# Patient Record
Sex: FEMALE | Race: WHITE | Employment: UNEMPLOYED | URBAN - METROPOLITAN AREA
[De-identification: names, ages, dates, MRNs, and addresses within clinical notes are randomized per-mention and may not be internally consistent; named-entity substitution may affect disease eponyms.]

---

## 2021-05-19 ENCOUNTER — APPOINTMENT (OUTPATIENT)
Dept: ULTRASOUND IMAGING | Age: 86
DRG: 057 | End: 2021-05-19
Payer: MEDICARE

## 2021-05-19 ENCOUNTER — APPOINTMENT (OUTPATIENT)
Dept: CT IMAGING | Age: 86
DRG: 057 | End: 2021-05-19
Payer: MEDICARE

## 2021-05-19 ENCOUNTER — APPOINTMENT (OUTPATIENT)
Dept: MRI IMAGING | Age: 86
DRG: 057 | End: 2021-05-19
Payer: MEDICARE

## 2021-05-19 ENCOUNTER — HOSPITAL ENCOUNTER (INPATIENT)
Age: 86
LOS: 5 days | Discharge: NURSING, REHAB OR LONG-TERM CARE | DRG: 057 | End: 2021-05-26
Attending: EMERGENCY MEDICINE | Admitting: FAMILY MEDICINE
Payer: MEDICARE

## 2021-05-19 ENCOUNTER — APPOINTMENT (OUTPATIENT)
Dept: GENERAL RADIOLOGY | Age: 86
DRG: 057 | End: 2021-05-19
Payer: MEDICARE

## 2021-05-19 DIAGNOSIS — R53.1 WEAKNESS OF ONE SIDE OF BODY: ICD-10-CM

## 2021-05-19 DIAGNOSIS — R41.82 ALTERED MENTAL STATUS, UNSPECIFIED ALTERED MENTAL STATUS TYPE: Primary | ICD-10-CM

## 2021-05-19 DIAGNOSIS — R26.9 GAIT DISTURBANCE: ICD-10-CM

## 2021-05-19 LAB
ALBUMIN SERPL-MCNC: 3.8 G/DL (ref 3.5–4.6)
ALP BLD-CCNC: 99 U/L (ref 40–130)
ALT SERPL-CCNC: 7 U/L (ref 0–33)
ANION GAP SERPL CALCULATED.3IONS-SCNC: 9 MEQ/L (ref 9–15)
AST SERPL-CCNC: 17 U/L (ref 0–35)
BACTERIA: NEGATIVE /HPF
BASOPHILS ABSOLUTE: 0 K/UL (ref 0–0.2)
BASOPHILS RELATIVE PERCENT: 0.8 %
BILIRUB SERPL-MCNC: 0.8 MG/DL (ref 0.2–0.7)
BILIRUBIN URINE: NEGATIVE
BLOOD, URINE: ABNORMAL
BUN BLDV-MCNC: 25 MG/DL (ref 8–23)
CALCIUM SERPL-MCNC: 8.9 MG/DL (ref 8.5–9.9)
CHLORIDE BLD-SCNC: 101 MEQ/L (ref 95–107)
CLARITY: CLEAR
CO2: 25 MEQ/L (ref 20–31)
COLOR: YELLOW
CREAT SERPL-MCNC: 0.74 MG/DL (ref 0.5–0.9)
EKG ATRIAL RATE: 77 BPM
EKG P AXIS: 63 DEGREES
EKG P-R INTERVAL: 136 MS
EKG Q-T INTERVAL: 388 MS
EKG QRS DURATION: 84 MS
EKG QTC CALCULATION (BAZETT): 439 MS
EKG R AXIS: 58 DEGREES
EKG T AXIS: 37 DEGREES
EKG VENTRICULAR RATE: 77 BPM
EOSINOPHILS ABSOLUTE: 0 K/UL (ref 0–0.7)
EOSINOPHILS RELATIVE PERCENT: 0.4 %
EPITHELIAL CELLS, UA: NORMAL /HPF (ref 0–5)
GFR AFRICAN AMERICAN: >60
GFR NON-AFRICAN AMERICAN: >60
GLOBULIN: 2.9 G/DL (ref 2.3–3.5)
GLUCOSE BLD-MCNC: 109 MG/DL (ref 70–99)
GLUCOSE URINE: NEGATIVE MG/DL
HCT VFR BLD CALC: 35.2 % (ref 37–47)
HEMOGLOBIN: 11.9 G/DL (ref 12–16)
HYALINE CASTS: NORMAL /HPF (ref 0–5)
KETONES, URINE: NEGATIVE MG/DL
LACTIC ACID: 0.8 MMOL/L (ref 0.5–2.2)
LEUKOCYTE ESTERASE, URINE: NEGATIVE
LYMPHOCYTES ABSOLUTE: 0.8 K/UL (ref 1–4.8)
LYMPHOCYTES RELATIVE PERCENT: 12.9 %
MAGNESIUM: 1.8 MG/DL (ref 1.7–2.4)
MCH RBC QN AUTO: 29.4 PG (ref 27–31.3)
MCHC RBC AUTO-ENTMCNC: 33.8 % (ref 33–37)
MCV RBC AUTO: 87.2 FL (ref 82–100)
MONOCYTES ABSOLUTE: 0.6 K/UL (ref 0.2–0.8)
MONOCYTES RELATIVE PERCENT: 9 %
NEUTROPHILS ABSOLUTE: 4.8 K/UL (ref 1.4–6.5)
NEUTROPHILS RELATIVE PERCENT: 76.9 %
NITRITE, URINE: NEGATIVE
PDW BLD-RTO: 13.5 % (ref 11.5–14.5)
PH UA: 5 (ref 5–9)
PLATELET # BLD: 221 K/UL (ref 130–400)
POTASSIUM SERPL-SCNC: 4.1 MEQ/L (ref 3.4–4.9)
PROTEIN UA: NEGATIVE MG/DL
RBC # BLD: 4.03 M/UL (ref 4.2–5.4)
RBC UA: NORMAL /HPF (ref 0–5)
SODIUM BLD-SCNC: 135 MEQ/L (ref 135–144)
SPECIFIC GRAVITY UA: 1.02 (ref 1–1.03)
TOTAL PROTEIN: 6.7 G/DL (ref 6.3–8)
TROPONIN: <0.01 NG/ML (ref 0–0.01)
URINE REFLEX TO CULTURE: ABNORMAL
UROBILINOGEN, URINE: 1 E.U./DL
WBC # BLD: 6.3 K/UL (ref 4.8–10.8)
WBC UA: NORMAL /HPF (ref 0–5)

## 2021-05-19 PROCEDURE — 99284 EMERGENCY DEPT VISIT MOD MDM: CPT

## 2021-05-19 PROCEDURE — 83605 ASSAY OF LACTIC ACID: CPT

## 2021-05-19 PROCEDURE — 73090 X-RAY EXAM OF FOREARM: CPT

## 2021-05-19 PROCEDURE — G0378 HOSPITAL OBSERVATION PER HR: HCPCS

## 2021-05-19 PROCEDURE — 2580000003 HC RX 258: Performed by: FAMILY MEDICINE

## 2021-05-19 PROCEDURE — 83735 ASSAY OF MAGNESIUM: CPT

## 2021-05-19 PROCEDURE — 84484 ASSAY OF TROPONIN QUANT: CPT

## 2021-05-19 PROCEDURE — 6370000000 HC RX 637 (ALT 250 FOR IP): Performed by: FAMILY MEDICINE

## 2021-05-19 PROCEDURE — 70551 MRI BRAIN STEM W/O DYE: CPT

## 2021-05-19 PROCEDURE — 70544 MR ANGIOGRAPHY HEAD W/O DYE: CPT

## 2021-05-19 PROCEDURE — 97166 OT EVAL MOD COMPLEX 45 MIN: CPT

## 2021-05-19 PROCEDURE — 97162 PT EVAL MOD COMPLEX 30 MIN: CPT

## 2021-05-19 PROCEDURE — 96372 THER/PROPH/DIAG INJ SC/IM: CPT

## 2021-05-19 PROCEDURE — 93005 ELECTROCARDIOGRAM TRACING: CPT | Performed by: EMERGENCY MEDICINE

## 2021-05-19 PROCEDURE — 93880 EXTRACRANIAL BILAT STUDY: CPT

## 2021-05-19 PROCEDURE — 81001 URINALYSIS AUTO W/SCOPE: CPT

## 2021-05-19 PROCEDURE — 80053 COMPREHEN METABOLIC PANEL: CPT

## 2021-05-19 PROCEDURE — 6360000002 HC RX W HCPCS: Performed by: FAMILY MEDICINE

## 2021-05-19 PROCEDURE — 93010 ELECTROCARDIOGRAM REPORT: CPT | Performed by: INTERNAL MEDICINE

## 2021-05-19 PROCEDURE — 70450 CT HEAD/BRAIN W/O DYE: CPT

## 2021-05-19 PROCEDURE — 85025 COMPLETE CBC W/AUTO DIFF WBC: CPT

## 2021-05-19 PROCEDURE — 36415 COLL VENOUS BLD VENIPUNCTURE: CPT

## 2021-05-19 RX ORDER — LEVOTHYROXINE SODIUM 88 UG/1
88 TABLET ORAL DAILY
COMMUNITY

## 2021-05-19 RX ORDER — ONDANSETRON 2 MG/ML
4 INJECTION INTRAMUSCULAR; INTRAVENOUS EVERY 6 HOURS PRN
Status: DISCONTINUED | OUTPATIENT
Start: 2021-05-19 | End: 2021-05-26 | Stop reason: HOSPADM

## 2021-05-19 RX ORDER — SODIUM CHLORIDE 0.9 % (FLUSH) 0.9 %
5-40 SYRINGE (ML) INJECTION EVERY 12 HOURS SCHEDULED
Status: DISCONTINUED | OUTPATIENT
Start: 2021-05-19 | End: 2021-05-26 | Stop reason: HOSPADM

## 2021-05-19 RX ORDER — SODIUM CHLORIDE 9 MG/ML
25 INJECTION, SOLUTION INTRAVENOUS PRN
Status: DISCONTINUED | OUTPATIENT
Start: 2021-05-19 | End: 2021-05-26 | Stop reason: HOSPADM

## 2021-05-19 RX ORDER — ASPIRIN 81 MG/1
81 TABLET ORAL DAILY
Status: DISCONTINUED | OUTPATIENT
Start: 2021-05-19 | End: 2021-05-24

## 2021-05-19 RX ORDER — POLYETHYLENE GLYCOL 3350 17 G/17G
17 POWDER, FOR SOLUTION ORAL DAILY PRN
Status: DISCONTINUED | OUTPATIENT
Start: 2021-05-19 | End: 2021-05-26 | Stop reason: HOSPADM

## 2021-05-19 RX ORDER — PROMETHAZINE HYDROCHLORIDE 12.5 MG/1
12.5 TABLET ORAL EVERY 6 HOURS PRN
Status: DISCONTINUED | OUTPATIENT
Start: 2021-05-19 | End: 2021-05-26 | Stop reason: HOSPADM

## 2021-05-19 RX ORDER — ASPIRIN 300 MG/1
300 SUPPOSITORY RECTAL DAILY
Status: DISCONTINUED | OUTPATIENT
Start: 2021-05-19 | End: 2021-05-24

## 2021-05-19 RX ORDER — SODIUM CHLORIDE 0.9 % (FLUSH) 0.9 %
5-40 SYRINGE (ML) INJECTION PRN
Status: DISCONTINUED | OUTPATIENT
Start: 2021-05-19 | End: 2021-05-26 | Stop reason: HOSPADM

## 2021-05-19 RX ORDER — ATORVASTATIN CALCIUM 40 MG/1
80 TABLET, FILM COATED ORAL NIGHTLY
Status: DISCONTINUED | OUTPATIENT
Start: 2021-05-19 | End: 2021-05-24

## 2021-05-19 RX ADMIN — ATORVASTATIN CALCIUM 80 MG: 40 TABLET, FILM COATED ORAL at 22:09

## 2021-05-19 RX ADMIN — Medication 10 ML: at 22:09

## 2021-05-19 RX ADMIN — ASPIRIN 81 MG: 81 TABLET, COATED ORAL at 17:33

## 2021-05-19 RX ADMIN — ENOXAPARIN SODIUM 40 MG: 40 INJECTION SUBCUTANEOUS at 17:33

## 2021-05-19 ASSESSMENT — PAIN SCALES - PAIN ASSESSMENT IN ADVANCED DEMENTIA (PAINAD)
TOTALSCORE: 2
BREATHING: 0
FACIALEXPRESSION: 0
NEGVOCALIZATION: 1

## 2021-05-19 NOTE — ED NOTES
Daughter states the Pt has c/o right forearm pain for the past week, Dr Christ Lopez aware, orders received.      Benjy Stewart RN  05/19/21 1746

## 2021-05-19 NOTE — CARE COORDINATION
[facility] [place] Case Management Initial Discharge Assessment    Met with Patient to discuss discharge plan. PCP:   Dr Marquez Pastrana                              Date of Last Visit: 2 months    If no PCP, list provided? N/A    Discharge Planning    Living Arrangements: at home dependent on family care    Who do you live with? daughter    Who helps you with your care:  family    If lives at home:     Do you have any barriers navigating in your home? no    Patient can perform ADL? Needs assistance    Current Services (outpatient and in home) :  None    Dialysis: No    Is transportation available to get to your appointments? Yes    DME Equipment:  no    Respiratory equipment: None    Respiratory provider:  no     Pharmacy:  yes - [facility]    Consult with Medication Assistance Program?  No      Patient agreeable to [facility]? Information given to the patient's daughter    Patient agreeable to SNF/Rehab? Information given to pt's daughter    Other discharge needs identified? She may need placement vs hhc    Does Patient Have a High-Risk for Readmission Diagnosis (CHF, PN, MI, COPD)? No    Initial Discharge Plan? (Note: please see concurrent daily documentation for any updates after initial note). Daughter requested and is given information on hhc/snf/rehab facilities. Cm to follow up.     Readmission Risk              Risk of Unplanned Readmission:  0         Electronically signed by Adam Carrizales on 5/19/2021 at 4:43 PM

## 2021-05-19 NOTE — ACP (ADVANCE CARE PLANNING)
Advance Care Planning     Advance Care Planning Activator (Inpatient)  Conversation Note      Date of ACP Conversation: 5/19/2021     Conversation Conducted with:  Healthcare Decision Maker: Named in Advance Directive or Healthcare Power of  (name) Celestino Mann    ACP Activator: Augie Weller    Pt has a signed dnrcc-arrest on the chart. Health Care Decision Maker:     Current Designated Health Care Decision Maker:  Kali Posada: daughter,poa. Xi Marie, son, [phone] secondary decision maker.

## 2021-05-19 NOTE — PROGRESS NOTES
Physical Therapy Med Surg Initial Assessment  Facility/Department: [facility]  Room: [facility]/S605-86       NAME: Shanika Pascal  : 1935 (80 y.o.)  MRN: [id number]  CODE STATUS: DNR-CCA    Date of Service: 2021    Patient Diagnosis(es): Right sided weakness [R53.1]   Chief Complaint   Patient presents with    Altered Mental Status     daughter called EMS for increased AMS and weakness that started this AM when she woke up      Patient Active Problem List    Diagnosis Date Noted    Right sided weakness 2021        Past Medical History:   Diagnosis Date    Alzheimer disease ([facility] [address]     Thyroid disease      History reviewed. No pertinent surgical history. Chart Reviewed: Yes  Patient assessed for rehabilitation services?: Yes  Family / Caregiver Present: Yes (dtr)  General Comment  Comments: Pt laying in ED cot, agreeable to PT eval    Restrictions:  Restrictions/Precautions: Fall Risk (High per clinical judgement)     SUBJECTIVE:   Garbled speech, difficulty understanding pt.      Pain  Pre Treatment Pain Screening  Comments / Details: pt with advanced dementia, c/o R wrist pain, unable to rate, does not appear to be in significant distress    Post Treatment Pain Screening:   Pain Screening  Patient Currently in Pain: Yes  Pain Assessment  Pain Assessment: Advanced Dementia    Prior Level of Function:  Social/Functional History  Lives With: Daughter  Type of Home: House  Home Layout: Multi-level (4 stairs down to familyroom, full bath on lower level- pt stays on this level)  Home Access: Level entry  Bathroom Shower/Tub: Walk-in shower  Bathroom Equipment: Shower chair  Home Equipment: Rolling walker  ADL Assistance: Independent  Homemaking Assistance: Needs assistance  Homemaking Responsibilities: No  Ambulation Assistance: Independent (assistance with stairs; and hand held recently d/t pain in UE)  Transfer Assistance: Independent  Additional Comments: Dtr reports increased caregiver burden, reports hx of falls    OBJECTIVE:   Vision: Impaired  Vision Exceptions: Wears glasses for reading  Hearing: Exceptions to [facility]  Hearing Exceptions: Hard of hearing/hearing concerns; Left hearing aid    Cognition:  Overall Orientation Status: Impaired  Orientation Level: Oriented to person, Disoriented to time, Disoriented to situation, Disoriented to place (unable to state )  Follows Commands: Impaired (follows 1 step commands inconsistantly, ~25% of the time, requires increased reps)    Observation/Palpation  Posture: Fair  Observation: pleasantly confused, garbled speech, no acute distress, no visable deformity of R wrist    ROM:  RLE General PROM: impaired hip flexor and HS flexibility  RLE AROM: WFL  LLE General PROM: impaired hip flexor and HS flexibility  LLE AROM : WFL    Strength:  Strength RLE  Comment: hip 3/5, knee 3+/5, ankle 3+/5  Strength LLE  Comment: hip 4/5, knee 4/5, ankle 4/5    Neuro:  Balance  Posture: Fair  Sitting - Static: Fair;- (Min A with R sided lean)  Sitting - Dynamic: Fair;-  Standing - Static: Poor (R sided lean requiring Mod A to maintain midline posture, arm in arm)  Standing - Dynamic: Poor     Motor Control  Gross Motor?: Exceptions  Comments: R LE sided weakness  Sensation  Overall Sensation Status: WFL (denies numbness; responds to light touch and pressure in all extremities)    Bed mobility  Supine to Sit: Moderate assistance;2 Person assistance  Sit to Supine: 2 Person assistance; Moderate assistance   Increased time and effort noted    Transfers  Sit to Stand: Moderate Assistance  Stand to sit: Moderate Assistance  Comment: R lean. VC and tactile cues to initiate standing, assist with R LE placement to widen DAVID, prior to attempt    Ambulation  Ambulation?: Yes  More Ambulation?: Yes  Ambulation 1  Device: Hand-Held Assist (arm in arm)  Assistance: Stand by assistance  Gait Deviations: Slow Darya; Increased DAVID  Distance: 0ft  Comments: pregait- wt shfiting, unable to elicit stepping responce with R LE. Attempts to lift L LE but unable to progress to stepping        Activity Tolerance  Activity Tolerance: Patient Tolerated treatment well      PT Education  PT Education: Goals;PT Role;Plan of Care    ASSESSMENT:   Body structures, Functions, Activity limitations: Decreased functional mobility ; Decreased strength;Decreased ROM; Decreased balance  Decision Making: Medium Complexity  History: med  Exam: med  Clinical Presentation: med    Prognosis: Good  Barriers to Learning: none    DISCHARGE RECOMMENDATIONS:  Discharge Recommendations: Continue to assess pending progress, Patient would benefit from continued therapy after discharge    Assessment: Pt demonstrates the above deficits and decline in functional mobility status placing them at increased risk for falls. Pt demonstrates R sided weakness and R lean in sitting and standing. Pt unable to sequence stepping response despite assistance EOB this date. Pt would benefit from physical therapy to address above deficits and allow for safe return home at highest level of function, decrease risk for falls, and improve QOL.     REQUIRES PT FOLLOW UP: Yes      PLAN OF CARE:  Plan  Times per week: 3-6  Current Treatment Recommendations: Strengthening, Functional Mobility Training, Neuromuscular Re-education, Home Exercise Program, Equipment Evaluation, Education, & procurement, Modalities, Safety Education & Training, Transfer Training, Gait Training, Balance Training, Stair training, Positioning, Patient/Caregiver Education & Training  Safety Devices  Type of devices: Left in bed    Goals:  Patient goals : unable to state; dtr wishes for pt to be able to return home  Long term goals  Long term goal 1: Bed mobility with SBA  Long term goal 2: Functional transfers with SBA  Long term goal 3: Amb household distance    [facility] (6 CLICK) [address] Mobility Raw Score : 10     Therapy Time:   Individual   Time In 1145   Time Out 1208   Minutes [address], 05/19/21 at 4:54 PM         Definitions for assistance levels  Independent = pt does not require any physical supervision or assistance from another person for activity completion. Device may be needed.   Stand by assistance = pt requires verbal cues or instructions from another person, close to but not touching, to perform the activity  Minimal assistance= pt performs 75% or more of the activity; assistance is required to complete the activity  Moderate assistance= pt performs 50% of the activity; assistance is required to complete the activity  Maximal assistance = pt performs 25% of the activity; assistance is required to complete the activity  Dependent = pt requires total physical assistance to accomplish the task

## 2021-05-19 NOTE — FLOWSHEET NOTE
New admission to room 270. Admission assessment done. Alert to self only Disorient to time, place and situation. VSS. Lung sounds clear BS present x3 PERRLA. No signs of distress. Repositioned. No other needs Bed alarm on Call light in reach Awaiting for daughter to visit to finish admission     Inge daughter at bedside assisted with admission. HPOA and living will copies placed in chart. Patients medication (synthroid) sent to pharmacy for verification. 1630 bedside eval done Patient was able to eat a whole applesauce and drink 2 cups of water without difficulties of swallowing and no coughing noted.

## 2021-05-19 NOTE — PROGRESS NOTES
[facility] OCCUPATIONAL THERAPY EVALUATION - ACUTE     NAME: Jose Elias Winkler  : 1935 (80 y.o.)  MRN: [id number]  CODE STATUS: Full Code  Room:     Date of Service: 2021    Patient Diagnosis(es): Right sided weakness [R53.1]   Chief Complaint   Patient presents with    Altered Mental Status     daughter called EMS for increased AMS and weakness that started this AM when she woke up      Patient Active Problem List    Diagnosis Date Noted    Right sided weakness 2021        Past Medical History:   Diagnosis Date    Alzheimer disease ([facility] [address]     Thyroid disease      History reviewed. No pertinent surgical history. Restrictions  Restrictions/Precautions: Fall Risk (High per clinical judgement)     Safety Devices: Safety Devices  Safety Devices in place: Yes  Type of devices: All fall risk precautions in place        Subjective  Pre Treatment Pain Screening  Pain at present: 2  Scale Used:  (Advanced dementia)  Intervention List: Patient able to continue with treatment  Comments / Details: Pt indicates R hand pain; RN aware    Pain Reassessment:   Pain Assessment  Patient Currently in Pain: Yes  Pain Assessment: Advanced Dementia       Prior Level of Function:  Social/Functional History  Lives With: Daughter  Type of Home: House  Home Layout: Multi-level (4 stairs down to familyroom, full bath on lower level- pt stays on this level)  Home Access: Level entry  Bathroom Shower/Tub: Walk-in shower  Bathroom Equipment: Shower chair  Home Equipment: Rolling walker  ADL Assistance: Independent  Homemaking Assistance: Needs assistance  Homemaking Responsibilities: No  Ambulation Assistance: Independent (assistance with stairs; and hand held recently d/t pain in UE)  Transfer Assistance: Independent  Additional Comments: Dtr reports increased caregiver burden, reports hx of falls. Daughter home .  History per daughter; pt. aphasic    OBJECTIVE:     Orientation Status:  Orientation  Overall Orientation Status: Impaired  Orientation Level:  (Pt has baseline dementia. Per daughter, normally oriented to self. Pt. has had significantly increased difficulty verbalizing x3 days per daughter)    Observation:  Observation/Palpation  Posture: Fair (R lean, kyphotic)  Observation: Pt. alert and attentive    Cognition Status:  Cognition  Overall Cognitive Status: Exceptions  Arousal/Alertness: Delayed responses to stimuli  Following Commands: Follows one step commands with increased time, Follows one step commands with repetition  Attention Span: Difficulty attending to directions  Memory:  (Unable to assess d/t aphasia. Per daughter, significantly impaired at baseline)  Safety Judgement: Decreased awareness of need for safety, Decreased awareness of need for assistance  Problem Solving: Decreased awareness of errors  Insights: Decreased awareness of deficits  Initiation: Requires cues for all  Sequencing: Requires cues for all  Cognition Comment: Max increased cues and sequencing for mobility tasks    Perception Status:  Perception  Overall Perceptual Status: WFL    Sensation Status:  Sensation  Overall Sensation Status: [employer]    Vision and Hearing Status:  Vision  Vision: Impaired  Vision Exceptions: Wears glasses for reading  Hearing  Hearing: Exceptions to [facility]  Hearing Exceptions: Hard of hearing/hearing concerns, Left hearing aid     ROM:   LUE AROM (degrees)  LUE General AROM: Shoulders to 80°. Left Hand AROM (degrees)  Left Hand AROM: WFL  RUE AROM (degrees)  RUE AROM : [employer]  RUE General AROM: Shoulders to 80°.   Right Hand AROM (degrees)  Right Hand General AROM: Significant arthritic changes    Strength:  LUE Strength  Gross LUE Strength: Exceptions to [employer]  L Hand General: 3/5  LUE Strength Comment: 3/5 all planes  RUE Strength  Gross RUE Strength: Exceptions to [facility]  R Hand General: NT (Too much pain for attempting)  RUE Strength Comment: 3/5 at shoulders/elbows    Coordination, Tone, Quality of Movement: Tone RUE  RUE Tone: Normotonic  Tone LUE  LUE Tone: Normotonic  Coordination  Movements Are Fluid And Coordinated: No  Coordination and Movement description: Fine motor impairments, Decreased speed, Decreased accuracy, Left UE, Right UE  Quality of Movement Other  Comment: Pt with B hand arthritic changes, R greater than L    Hand Dominance:  Hand Dominance  Hand Dominance: Right    ADL Status:  ADL  Feeding: Moderate assistance  Grooming: Moderate assistance  UE Bathing: Maximum assistance  LE Bathing: Dependent/Total  UE Dressing: Maximum assistance  LE Dressing: Dependent/Total  Toileting: Dependent/Total  Additional Comments: Simulated ADLs as above. Pt. limited d/t R hand pain, decreased sequencing and decreased direction following. Toilet Transfers  Toilet Transfer: Unable to assess  Toilet Transfers Comments: Anticipate mod A       Therapy key for assistance levels -   Independent = Pt. is able to perform task with no assistance but may require a device   Stand by assistance = Pt. does not perform task at an independent level but does not need physical assistance, requires verbal cues  Minimal, Moderate, Maximal Assistance = Pt. requires physical assistance (25%, 50%, 75% assist from helper) for task but is able to actively participate in task   Dependent = Pt. requires total assistance with task and is not able to actively participate with task completion     Functional Mobility:  Functional Mobility  Functional Mobility Comments: Attempts side step to [facility], only manages 2 steps with max A unable to step safely  Transfers  Sit to stand:  Moderate assistance, 2 Person assistance  Stand to sit: Moderate assistance, 2 Person assistance  Transfer Comments: Heavy R lean    Bed Mobility  Bed mobility  Supine to Sit: Moderate assistance, 2 Person assistance  Sit to Supine: 2 Person assistance, Moderate assistance  Comment: Makes good effort to assist. Unable to push with R hand to assist and requires assist of 2 to complete transition to EOB    Seated and Standing Balance:  Balance  Sitting Balance: Moderate assistance  Standing Balance: Maximum assistance (Assist of 2, heavy R lean)    Functional Endurance:  Activity Tolerance  Activity Tolerance: Patient Tolerated treatment well    D/C Recommendations:  OT D/C RECOMMENDATIONS  REQUIRES OT FOLLOW UP: Yes    Equipment Recommendations:  OT Equipment Recommendations  Other: Continue to assess    OT Education:   OT Education  OT Education: OT Role, Plan of Care  Patient Education: Educated pt on importance of mobility, educated family on role of acute care OT  Barriers to Learning: Baseline dementia in pt. OT Follow Up:  OT D/C RECOMMENDATIONS  REQUIRES OT FOLLOW UP: Yes       Assessment/Discharge Disposition:  Assessment: Pt is an 80year old woman from home with family who presents to [facility] with the above deficits which impact her ability to perform ADLs and IADLs. Pt. limited d/t fatigue, weakness and decreased cognition. Pt. would benefit from continued OT to maximize independence and safety with ADL tasks.   Performance deficits / Impairments: Decreased functional mobility , Decreased ADL status, Decreased strength, Decreased safe awareness, Decreased endurance, Decreased cognition, Decreased high-level IADLs, Decreased fine motor control, Decreased balance, Decreased coordination, Decreased posture  Prognosis: Good  Discharge Recommendations: Continue to assess pending progress  Decision Making: Medium Complexity  History: Pt's medical history is moderately complex  Exam: Pt. has 11 performance deficits  Assistance / Modification: Pt. requires max A    Six Click Score   How much help for putting on and taking off regular lower body clothing?: Total  How much help for Bathing?: A Lot  How much help for Toileting?: Total  How much help for putting on and taking off regular upper body clothing?: A Lot  How much help for taking care of personal grooming?: A Lot  How much help for eating meals?: A Little  AM-[facility] Inpatient Daily Activity Raw Score: 11  AM-PAC Inpatient ADL T-Scale Score : 29.04  ADL Inpatient CMS 0-100% Score: 70.42    Plan:  Plan  Times per week: 1-4x  Plan weeks: Length of acute stay  Current Treatment Recommendations: Strengthening, Balance Training, Functional Mobility Training, Endurance Training, Pain Management, Safety Education & Training, Patient/Caregiver Education & Training, Equipment Evaluation, Education, & procurement, Self-Care / ADL, Cognitive/Perceptual Training    Goals:   Patient will:    - Improve functional endurance to tolerate/complete 30 mins of ADL's  - Be Mod A in UB ADLs   - Be Mod A in LB ADLs  - Be Min A in ADL transfers without LOB  - Be Min A in toileting tasks  - Improve R hand fine motor coordination to [facility] in order to manage clothing fasteners/self-care containers in a timely manner  - Improve B UE strength and endurance to 4-/5 in order to participate in self-care activities as projected. - Access appropriate D/C site with as few architectural barriers as possible. - Sequence self-care tasks with 50% verbal cues for sequencing    Patient Goal: Patient goals : Family goal; For pt. to ambulate better     Discussed and agreed upon: Yes Comments:     Therapy Time:   OT Individual Minutes  Time In: 1145  Time Out: [address]  Minutes: 23    Eval: 23 minutes     Electronically signed by:     Lisa Garcia OTR/L, OTR/L  5/19/2021, 12:48 PM

## 2021-05-19 NOTE — ED PROVIDER NOTES
[address] [facility] ED  EMERGENCY DEPARTMENT ENCOUNTER      Pt Name: Socorro Mancuso  MRN: [id number]  [place] 1935  Date of evaluation: 5/19/2021  Provider: Leigh Dance, DO    CHIEF COMPLAINT       Chief Complaint   Patient presents with    Altered Mental Status     daughter called EMS for increased AMS and weakness that started this AM when she woke up          HISTORY OF PRESENT ILLNESS   (Location/Symptom, Timing/Onset, Context/Setting, Quality, Duration, Modifying Factors, Severity)  Note limiting factors. Socorro Mancuso is a 80 y.o. female who presents to the emergency department . Patient brought in because daughter felt patient was more confused and seemed to be weak. Legs were giving out. She did not fall today. She did have a fall last month and has been complaining of right forearm pain since. Patient does have dementia. Has never been seen at this hospital and only moved to the area a year ago. Apparently, she could walk, bath with assistance, feed herself 1 month ago. 3 days ago she became more confused and cant walk, stand or be independent at all. HPI    Nursing Notes were reviewed. REVIEW OF SYSTEMS    (2-9 systems for level 4, 10 or more for level 5)     Review of Systems   Unable to perform ROS: Dementia   Neurological: Positive for weakness. Psychiatric/Behavioral: Positive for confusion. Except as noted above the remainder of the review of systems was reviewed and negative. PAST MEDICAL HISTORY     Past Medical History:   Diagnosis Date    Alzheimer disease ([facility] [address]     Thyroid disease          SURGICAL HISTORY     No past surgical history on file. CURRENT MEDICATIONS       Previous Medications    No medications on file       ALLERGIES     Patient has no allergy information on record. FAMILY HISTORY     No family history on file. SOCIAL HISTORY       Social History     Socioeconomic History    Marital status:       Spouse name: Not on file    Number of children: Not on file    Years of education: Not on file    Highest education level: Not on file   Occupational History    Not on file   Tobacco Use    Smoking status: Not on file   Substance and Sexual Activity    Alcohol use: Not on file    Drug use: Not on file    Sexual activity: Not on file   Other Topics Concern    Not on file   Social History Narrative    Not on file     Social Determinants of Health     Financial Resource Strain:     Difficulty of Paying Living Expenses:    Food Insecurity:     Worried About Running Out of Food in the Last Year:     [address] in the Last Year:    Transportation Needs:     Lack of Transportation (Medical):  Lack of Transportation (Non-Medical):    Physical Activity:     Days of Exercise per Week:     Minutes of Exercise per Session:    Stress:     Feeling of Stress :    Social Connections:     Frequency of Communication with Friends and Family:     Frequency of Social Gatherings with Friends and Family:     Attends [employer] Services:     Active Member of Clubs or Organizations:     Attends Club or Organization Meetings:     Marital Status:    Intimate Partner Violence:     Fear of Current or Ex-Partner:     Emotionally Abused:     Physically Abused:     Sexually Abused:        SCREENINGS        Roma Coma Scale  Eye Opening: Spontaneous  Best Verbal Response: Confused  Best Motor Response: Obeys commands  Roma Coma Scale Score: 14               PHYSICAL EXAM    (up to 7 for level 4, 8 or more for level 5)     ED Triage Vitals [05/19/21 0804]   BP Temp Temp Source Pulse Resp SpO2 Height Weight   131/67 98.2 °F (36.8 °C) Oral 76 16 97 % -- --       Physical Exam  Constitutional:       General: She is not in acute distress. Appearance: She is well-developed. She is not diaphoretic. HENT:      Head: Normocephalic and atraumatic.       Right Ear: External ear normal.      Left Ear: External ear normal.

## 2021-05-19 NOTE — H&P
Hospitalist History and Physical  Name: Ivonne Crain  Age: 80 y.o. Gender: female  CodeStatus: DNR-CCA  Allergies: Allergies have not been reviewed    Chief Complaint:Altered Mental Status (daughter called EMS for increased AMS and weakness that started this AM when she woke up )    Primary Care Provider: No primary care provider on file. InpatientTreatment Team: Treatment Team: Attending Provider: Boris Zhang DO  Admission Date: 5/19/2021      Subjective: Patient is an 80-year-old female with past medical history of Alzheimer's, partial colectomy with anastom, former smoker quit approximately 1 year ago and hypothyroidism who presented to the emergency room with \"increased confusion\" per daughter (primary caretaker). Daughter reported the patient seemed more weak, more confused and unable to ambulate in the last 3 days. Daughter also reports the patient has complained of right forearm pain since last month status post fall. Patient was seen by PCP on March 25, 2021 for the same complaints including the right arm pain. Per PCP notes: Patient's strength had declined, she requires help with transfers and ambulation, she has expressive aphasia, shuffling gait, parkinsonian features, short-term memory loss and confusion. Patient was presumed to have a urinary tract infection however cultures were negative. MRI from 8/1/2020 shows diffuse parenchymal volume loss consistent with Alzheimer's pathology. Patient relocated from [place] last winter 2019 to live with daughter due to her decline. Physical Exam  Constitutional:       General: She is awake. HENT:      Head: Normocephalic and atraumatic. Nose: Nose normal.      Mouth/Throat:      Pharynx: Oropharynx is clear. Eyes:      Conjunctiva/sclera: Conjunctivae normal.      Pupils: Pupils are equal, round, and reactive to light. Cardiovascular:      Rate and Rhythm: Normal rate and regular rhythm. Pulses: Normal pulses. Heart sounds: Normal heart sounds. Pulmonary:      Effort: Pulmonary effort is normal.      Breath sounds: Normal breath sounds. Abdominal:      General: Bowel sounds are normal.      Palpations: Abdomen is soft. Musculoskeletal:      Cervical back: Normal range of motion and neck supple. Skin:     General: Skin is warm and dry. Capillary Refill: Capillary refill takes less than 2 seconds. Neurological:      Mental Status: She is confused. Motor: Weakness present. Psychiatric:         Cognition and Memory: Cognition is impaired. Memory is impaired. Review of Systems   Unable to perform ROS: Dementia       Medications:  Reviewed     No current facility-administered medications on file prior to encounter. No current outpatient medications on file prior to encounter. Past Medical History:   Diagnosis Date    Alzheimer disease ([facility] [address]     Thyroid disease        History reviewed. No pertinent surgical history. History reviewed. No pertinent family history. Social History     Socioeconomic History    Marital status:      Spouse name: Not on file    Number of children: Not on file    Years of education: Not on file    Highest education level: Not on file   Occupational History    Not on file   Tobacco Use    Smoking status: Not on file   Substance and Sexual Activity    Alcohol use: Not on file    Drug use: Not on file    Sexual activity: Not on file   Other Topics Concern    Not on file   Social History Narrative    Not on file     Social Determinants of Health     Financial Resource Strain:     Difficulty of Paying Living Expenses:    Food Insecurity:     Worried About Running Out of Food in the Last Year:     [address] in the Last Year:    Transportation Needs:     Lack of Transportation (Medical):      Lack of Transportation (Non-Medical):    Physical Activity:     Days of Exercise per Week:     Minutes of Exercise per Session:    Stress: duplex, Telemetry, ASA, Statin, Neuro on board. PT OT to evaluate for deficits and to assess and make recommendations. Monitor BP closely. Neuro checks Q4hrs. Fall precautions. Bedside swallow eval prior to starting PO diet. Gait Instability: Fall precautions, Up with assist. PT OT. Maximize nutritional status. Consult to social work/case management for LTAC placement. Alzheimer's without behavioral disorder: reorient PRN. Avoiding BEERS Criteria meds    Hypothyroidism: On home dose Synthroid. I personally spent estimated 60 minutes with this patient today. Additional work up or/and treatment plan may be added today or then after based on clinical progression. I am managing a portion of pt care. Some medical issues are handled by other specialists. Additional work up and treatment should be done in out pt setting by pt PCP and other out pt providers. In addition to examining and evaluating pt, I spent additional time explaining care, normaland abnormal findings, and treatment plan. All of pt questions were answered. Counseling, diet and education were provided. Case will be discussed with nursing staff when appropriate. Family will be updated if and when appropriate.       Electronically signed by VIDYA Rowe CNP on 5/19/2021 at 1:15 PM

## 2021-05-20 LAB
CHOLESTEROL, TOTAL: 149 MG/DL (ref 0–199)
FOLATE: 5.8 NG/ML (ref 7.3–26.1)
HBA1C MFR BLD: 5.6 % (ref 4.8–5.9)
HCT VFR BLD CALC: 35.7 % (ref 37–47)
HDLC SERPL-MCNC: 56 MG/DL (ref 40–59)
HEMOGLOBIN: 12 G/DL (ref 12–16)
LDL CHOLESTEROL CALCULATED: 81 MG/DL (ref 0–129)
LV EF: 55 %
LVEF MODALITY: NORMAL
MCH RBC QN AUTO: 29.3 PG (ref 27–31.3)
MCHC RBC AUTO-ENTMCNC: 33.5 % (ref 33–37)
MCV RBC AUTO: 87.3 FL (ref 82–100)
PDW BLD-RTO: 13.3 % (ref 11.5–14.5)
PLATELET # BLD: 193 K/UL (ref 130–400)
RBC # BLD: 4.09 M/UL (ref 4.2–5.4)
TRIGL SERPL-MCNC: 62 MG/DL (ref 0–150)
TSH SERPL DL<=0.05 MIU/L-ACNC: 2 UIU/ML (ref 0.44–3.86)
VITAMIN B-12: 362 PG/ML (ref 232–1245)
WBC # BLD: 7 K/UL (ref 4.8–10.8)

## 2021-05-20 PROCEDURE — 6360000002 HC RX W HCPCS: Performed by: FAMILY MEDICINE

## 2021-05-20 PROCEDURE — 82746 ASSAY OF FOLIC ACID SERUM: CPT

## 2021-05-20 PROCEDURE — 92523 SPEECH SOUND LANG COMPREHEN: CPT

## 2021-05-20 PROCEDURE — 97535 SELF CARE MNGMENT TRAINING: CPT

## 2021-05-20 PROCEDURE — 36415 COLL VENOUS BLD VENIPUNCTURE: CPT

## 2021-05-20 PROCEDURE — G0378 HOSPITAL OBSERVATION PER HR: HCPCS

## 2021-05-20 PROCEDURE — 83036 HEMOGLOBIN GLYCOSYLATED A1C: CPT

## 2021-05-20 PROCEDURE — 6370000000 HC RX 637 (ALT 250 FOR IP): Performed by: FAMILY MEDICINE

## 2021-05-20 PROCEDURE — 84443 ASSAY THYROID STIM HORMONE: CPT

## 2021-05-20 PROCEDURE — 2580000003 HC RX 258: Performed by: FAMILY MEDICINE

## 2021-05-20 PROCEDURE — 92610 EVALUATE SWALLOWING FUNCTION: CPT

## 2021-05-20 PROCEDURE — 80061 LIPID PANEL: CPT

## 2021-05-20 PROCEDURE — 93306 TTE W/DOPPLER COMPLETE: CPT

## 2021-05-20 PROCEDURE — 82607 VITAMIN B-12: CPT

## 2021-05-20 PROCEDURE — 99222 1ST HOSP IP/OBS MODERATE 55: CPT | Performed by: PSYCHIATRY & NEUROLOGY

## 2021-05-20 PROCEDURE — 96372 THER/PROPH/DIAG INJ SC/IM: CPT

## 2021-05-20 PROCEDURE — 85027 COMPLETE CBC AUTOMATED: CPT

## 2021-05-20 RX ORDER — LORAZEPAM 2 MG/ML
1 INJECTION INTRAMUSCULAR
Status: ACTIVE | OUTPATIENT
Start: 2021-05-20 | End: 2021-05-20

## 2021-05-20 RX ORDER — LEVOTHYROXINE SODIUM 88 UG/1
88 TABLET ORAL DAILY
Status: DISCONTINUED | OUTPATIENT
Start: 2021-05-20 | End: 2021-05-26 | Stop reason: HOSPADM

## 2021-05-20 RX ORDER — ACETAMINOPHEN 325 MG/1
650 TABLET ORAL EVERY 4 HOURS PRN
Status: DISCONTINUED | OUTPATIENT
Start: 2021-05-20 | End: 2021-05-26 | Stop reason: HOSPADM

## 2021-05-20 RX ADMIN — Medication 10 ML: at 10:06

## 2021-05-20 RX ADMIN — LEVOTHYROXINE SODIUM 88 MCG: 88 TABLET ORAL at 15:19

## 2021-05-20 RX ADMIN — ASPIRIN 81 MG: 81 TABLET, COATED ORAL at 17:10

## 2021-05-20 RX ADMIN — ENOXAPARIN SODIUM 40 MG: 40 INJECTION SUBCUTANEOUS at 17:10

## 2021-05-20 RX ADMIN — Medication 10 ML: at 23:38

## 2021-05-20 RX ADMIN — ATORVASTATIN CALCIUM 80 MG: 40 TABLET, FILM COATED ORAL at 23:37

## 2021-05-20 ASSESSMENT — PAIN SCALES - PAIN ASSESSMENT IN ADVANCED DEMENTIA (PAINAD)
CONSOLABILITY: 0
BODYLANGUAGE: 0

## 2021-05-20 ASSESSMENT — PAIN - FUNCTIONAL ASSESSMENT: PAIN_FUNCTIONAL_ASSESSMENT: ADVANCED DEMENTIA

## 2021-05-20 NOTE — PROGRESS NOTES
[facility]   Facility/Department: [facility]  Speech Language Pathology  Clinical Bedside Swallow Evaluation    NAME:Amber Mann  : 1935 (80 y.o.)   MRN: [id number]  ROOM: [place] [id number]  ADMISSION DATE: 2021  PATIENT DIAGNOSIS(ES): Right sided weakness [R53.1]  Chief Complaint   Patient presents with    Altered Mental Status     daughter called EMS for increased AMS and weakness that started this AM when she woke up      Patient Active Problem List    Diagnosis Date Noted    Right sided weakness 2021     Past Medical History:   Diagnosis Date    Alzheimer disease ([facility] [address]     Thyroid disease      History reviewed. No pertinent surgical history. Not on File    DATE ONSET: 2021    Date of Evaluation: 2021   Evaluating Therapist: MARTIN Victoria    Recommended Diet and Intervention  Diet Solids Recommendation: Dysphagia Minced and Moist (Dysphagia II)  Liquid Consistency Recommendation: Thin  Recommended Form of Meds: Meds in puree  Recommendations: Dysphagia treatment; Total feed  Therapeutic Interventions: Patient/Family education, Diet tolerance monitoring    Compensatory Swallowing Strategies  Compensatory Swallowing Strategies: Small bites/sips;Upright as possible for all oral intake;Eat/Feed slowly; Total feed    Reason for Referral  Casper Sumner was referred for a bedside swallow evaluation to assess the efficiency of her swallow function, identify signs and symptoms of aspiration and make recommendations regarding safe dietary consistencies, effective compensatory strategies, and safe eating environment. General  Chart Reviewed: Yes  Subjective  Subjective: SLP observed trace-min residuals throughout the oral cavity prior to start of evaluation, likely remaining from lunch. Behavior/Cognition: Alert; Cooperative;Confused  Respiratory Status: O2 via nasual cannula  O2 Device: Nasal cannula  Communication Observation: Dysarthria  Follows Directions: Simple (inconsistently)  Dentition: Dentures top  Patient Positioning: Upright in bed  Baseline Vocal Quality: Weak;Dysphonic  Volitional Cough:  (patient unable to follow direction)  Prior Dysphagia History: None, patient eating regular foods prior to admission  Consistencies Administered: Dysphagia Pureed (Dysphagia I); Dysphagia Soft and Bite-Sized (Dysphagia III); Thin - straw    Current Diet level:  Current Diet :  (dental soft)  Current Liquid Diet : Thin    Oral Motor Deficits  Oral/Motor  Oral Motor: Exceptions to [facility] (patient unable to follow directions to complete)  Labial ROM: Reduced left; Reduced right  Labial Strength: Reduced (open mouth posture at rest)  Labial Coordination: Reduced  Lingual ROM: Reduced left; Reduced right  Lingual Strength: Reduced  Lingual Coordination: Reduced    Oral Phase Dysfunction  Oral Phase  Oral Phase: Exceptions  Oral Phase Dysfunction  Impaired Mastication: Soft Solid  Decreased Anterior to Posterior Transit: All  Suspected Premature Bolus Loss: All  Lingual/Palatal Residue: Soft solid;Puree  Oral Phase  Oral Phase - Comment: Moderate-severe oral dysphagia characterized by generalized oral weakness resulting in weak labial seal around straw/spoon, suspected delayed A-P transit, decreased bolus control/cohesion, and premature bolus loss to the pharynx. Impaired mastication of soft solids observed per soft solids with increased time needed. Mild residuals observed throughout the oral cavity post swallow that required prompted use of liquid wash to clear. Indicators of Pharyngeal Phase Dysfunction   Pharyngeal Phase  Pharyngeal Phase: WFL  Pharyngeal Phase   Pharyngeal: WFL at this time. Hyolaryngeal movement is present. No overt s/s of aspiration observed per all consistencies tested including consecutive sips of thin liquid via straw. Pharyngeal swallow delay is likely given patient's age but cannot be determined at bedside.     Impression  Dysphagia Diagnosis: Moderate to

## 2021-05-20 NOTE — DISCHARGE INSTR - COC
Continuity of Care Form    Patient Name: Brittany Cornejo   :  1935  MRN:  [id number]    Admit date:  2021  Discharge date:  2021    Code Status Order: DNR-CCA   Advance Directives:   [facility] Documentation       Date/Time Healthcare Directive Type of Healthcare Directive Copy in [address] Agent's Name Healthcare Agent's Phone Number    [phone]  Yes, patient has an advance directive for healthcare treatment  Durable power of  for health care; Health care treatment directive; Living will  --  --  --  --            Admitting Physician:  Kerry Louis MD  PCP: No primary care provider on file. Discharging Nurse: [facility] Unit/Room#: O118/U977-90  Discharging Unit Phone Number: [phone]    Emergency Contact:   Extended Emergency Contact Information  Primary Emergency Contact: Radha Vaughn  Home Phone: [phone]  Relation: Child  Preferred language: English   needed? No  Secondary Emergency Contact: Ashok Cabrales  Home Phone: [phone]  Relation: Child    Past Surgical History:  History reviewed. No pertinent surgical history. Immunization History: There is no immunization history on file for this patient.     Active Problems:  Patient Active Problem List   Diagnosis Code    Right sided weakness R53.1       Isolation/Infection:   Isolation            No Isolation          Patient Infection Status       None to display            Nurse Assessment:  Last Vital Signs: /63   Pulse 90   Temp 98.1 °F (36.7 °C) (Oral)   Resp 18   Ht 5' 3\" (1.6 m)   Wt 134 lb 14.4 oz (61.2 kg)   LMP  (LMP Unknown)   SpO2 100%   BMI 23.90 kg/m²     Last documented pain score (0-10 scale):    Last Weight:   Wt Readings from Last 1 Encounters:   21 134 lb 14.4 oz (61.2 kg)     Mental Status:  disoriented and alert    IV Access:  - None    Nursing Mobility/ADLs:  Walking   Assisted  Transfer Assisted  Bathing  Dependent  Dressing  Dependent  Toileting  Dependent  Feeding  Assisted  Med Admin  Assisted  Med Delivery   whole    Wound Care Documentation and Therapy:        Elimination:  Continence:   · Bowel: No  · Bladder: No  Urinary Catheter: None   Colostomy/Ileostomy/Ileal Conduit: No       Date of Last BM: 5/23/2021    Intake/Output Summary (Last 24 hours) at 5/20/2021 1514  Last data filed at 5/20/2021 1025  Gross per 24 hour   Intake 680 ml   Output 300 ml   Net 380 ml     I/O last 3 completed shifts: In: 65 [P.O.:680]  Out: 300 [Urine:300]    Safety Concerns:     [place], At Risk for Falls and History of Seizures    Impairments/Disabilities:      Speech and garbled speech    Nutrition Therapy:  Current Nutrition Therapy:   - Oral Diet:  General    Routes of Feeding: Oral  Liquids: Thin Liquids  Daily Fluid Restriction: no  Last Modified Barium Swallow with Video (Video Swallowing Test): not done    Treatments at the Time of Hospital Discharge:   Respiratory Treatments: NA  Oxygen Therapy:  is not on home oxygen therapy.   Ventilator:    - No ventilator support    Rehab Therapies: Physical Therapy, Occupational Therapy and Speech/Language Therapy  Weight Bearing Status/Restrictions: No weight bearing restirctions  Other Medical Equipment (for information only, NOT a DME order):  walker  Other Treatments: NA    Patient's personal belongings (please select all that are sent with patient):  None    RN SIGNATURE:  Electronically signed by Glendy Veliz RN on 5/26/21 at 6:41 PM EDT    CASE MANAGEMENT/SOCIAL WORK SECTION    Inpatient Status Date: 05/19/2021    Readmission Risk Assessment Score:  Readmission Risk              Risk of Unplanned Readmission:  0           Discharging to Facility/ Agency   · Name: [facility]  · Address:  · Phone:  · Fax:    Dialysis Facility (if applicable)   · Name:  · Address:  · Dialysis Schedule:  · Phone:  · Fax:    / signature: Electronically signed by JANICE Reinoso on 5/20/21 at 3:14 PM EDT    PHYSICIAN SECTION    Prognosis: {Prognosis:4725348985}    Condition at Discharge: Cameron Degroot Patient Condition:658198923}    Rehab Potential (if transferring to Rehab): {Prognosis:8495042832}    Recommended Labs or Other Treatments After Discharge: ***    Physician Certification: I certify the above information and transfer of Casper Sumner  is necessary for the continuing treatment of the diagnosis listed and that she requires {Admit to Appropriate Level of Care:33560} for {GREATER/LESS:308678871} 30 days.      Update Admission H&P: {CHP DME Changes in NUXUF:512221962}    PHYSICIAN SIGNATURE:  Electronically signed by Rajinder Hernandez MD on 5/26/21 at 6:21 PM EDT

## 2021-05-20 NOTE — PROGRESS NOTES
[facility]   Facility/Department: [facility] [address] [address]  Speech Language Pathology  Initial Speech/Language/Cognitive Assessment    NAME:Amber Mann  : 1935 (80 y.o.)   MRN: [id number]  ROOM: [place] [address]  ADMISSION DATE: 2021  PATIENT DIAGNOSIS(ES): Right sided weakness [R53.1]  Chief Complaint   Patient presents with    Altered Mental Status     daughter called EMS for increased AMS and weakness that started this AM when she woke up      Patient Active Problem List    Diagnosis Date Noted    Right sided weakness 2021     Past Medical History:   Diagnosis Date    Alzheimer disease ([facility] [address]     Thyroid disease      History reviewed. No pertinent surgical history. DATE ONSET: 2021    Date of Evaluation: 2021   Evaluating Therapist: MARTIN Hernandez    Assessment:   Diagnosis: Severe cognitive-linguistic impairment characterized by impaired orientation, ability to consistently follow 1-step directions, simple naming, and simple object identification. Patient has advanced dementia at baseline, however deficits appeared to have worsened over the past few days; Severe dysarthria characterized by generalized oral weakness with imprecise motor speech movements resulting in poor speech intelligibility at the word level. Recommendations:  Requires SLP Intervention: Yes  Duration/Frequency of Treatment: 1-3x/week for LOS or until goals are met  D/C Recommendations: 24 hour supervision/assistance  Referral To: Dietician       Goals:  Short-term Goals  Timeframe for Short-term Goals: 1 week  Goal 1: To address pt's cognitive deficits and promote orientation, pt will state name of facility, time within 1 hour, reason in hospital, current month and year with 75% accuracy following a model, with use of external aid. Goal 2: Pt will complete confrontational naming tasks with 75% accuracy following a model to help the patient express her basic wants and needs.   Goal 3: Pt will follow 1-step directions given orally with 75% accuracy with mod cues to increase the pt's ability to follow directions provided by caregivers for safe follow through with ADLs. Goal 4: Patient will match common objects to pictures with 75% accuracy with mod cues and following a model as needed to increase patient's ability to use an alternative communication method to communicate wants/needs in the presence of severe motor speech deficits. Goal 5: Pt will complete OM drills paired with speech sounds to improve speech intelligibility x5/each and expression of his/her complex thoughts, needs, feelings, and ideas. Long-term Goals  Timeframe for Long-term Goals: 1 week  Goal 1: Patient will demonstrate functional speech intelligibility in all opportunities with max assist in order to effectively communicate her wants and needs. Goal 2: Patient will demonstrate functional cognitive-linguistic abilities in all opportunities with max assist in order to safely complete ADLs. Subjective:   Previous level of function and limitations: advanced dementia  General  Chart Reviewed: Yes  Subjective  Subjective: Baseline advanced dementia. Per the patient's daughter, the patient is typically oriented to self and has decreased speech intelligibility, though she feels it has worsened over the past 3 days. Objective:  Oral/Motor  Oral Motor: Exceptions to [facility] (patient unable to follow directions to complete)  Labial ROM: Reduced left; Reduced right  Labial Strength: Reduced (open mouth posture at rest)  Labial Coordination: Reduced  Lingual ROM: Reduced left; Reduced right  Lingual Strength: Reduced  Lingual Coordination: Reduced    Auditory Comprehension  Comprehension: Exceptions  Yes/No Questions: Severe  Basic Questions: Severe  One Step Basic Commands: Moderate (inconsistently able)  Common Objects: Severe  Interfering Components: Processing speed; Working memory  Effective Techniques: Repetition; Slowed speech; Extra processing time;Visual/Gestural cues;Stressing words    Expression  Primary Mode of Expression: Verbal    Verbal Expression  Verbal Expression: Exceptions to functional limits  Initiation: Severe  Repetition: Severe  Automatic Speech: Severe (made no attempts to complete in tandem with SLP)  Confrontation: Moderate (named 4/6 simple objects)  Convergent:  (DNT)  Divergent:  (DNT)  Responsive:  (DNT)  Conversation: Severe    Motor Speech  Motor Speech: Exceptions to [facility]  Intelligibility: Severe  Dysarthria : Severe    Pragmatics/Social Functioning  Pragmatics: Unable to assess    Cognition:   Oriented to person only  KRISTAL problem solving, memory    Prognosis:  Speech Therapy Prognosis  Prognosis: Poor  Prognosis Considerations: Severity of Impairments  Additional Comments: Patient has advanced dementia, new learning is not likely to be achieved. Individuals consulted  Consulted and agree with results and recommendations: RN    Education:  Patient Education: Results of SLE  Patient Education Response: No evidence of learning  Safety Devices in place: Yes  Type of devices: Bed alarm in place    Pain Assessment:  Pre-Treatment  Pain assessment: 0-10  Pain level: unable to rate  Intervention:  Patient demonstrated no s/s of pain. Post-Treatment  Pain assessment: 0-10  Pain level: unable to rate  Intervention:  Patient demonstrated no s/s of pain.       Therapy Time  SLP Individual Minutes  Time In: 4153  Time Out: 12  Minutes: 10                 Signature: Electronically signed by MARTIN Quiroga on 5/20/2021 at 2:14 PM

## 2021-05-20 NOTE — PROGRESS NOTES
Physical Therapy Med Surg Daily Treatment Note  Facility/Department: [address] [place] [address] [address]  Room: [place] [address]       NAME: Osmani Irizarry  : 1935 (80 y.o.)  MRN: [id number]  CODE STATUS: DNR-CCA    Date of Service: 2021    Patient Diagnosis(es): Right sided weakness [R53.1]   Chief Complaint   Patient presents with    Altered Mental Status     daughter called EMS for increased AMS and weakness that started this AM when she woke up      Patient Active Problem List    Diagnosis Date Noted    Right sided weakness 2021        Past Medical History:   Diagnosis Date    Alzheimer disease ([facility] [address]     Thyroid disease      History reviewed. No pertinent surgical history. Restrictions  Restrictions/Precautions: Fall Risk (High per clinical judgement)    SUBJECTIVE   General  Chart Reviewed: Yes  Family / Caregiver Present: No  Subjective  Subjective: pt agreeable to tx, garbled speech. Pre-Session Pain Report  Pre Treatment Pain Screening  Pain at present: 0  Scale Used: Numeric Score  Intervention List: Patient able to continue with treatment  Pain Screening  Patient Currently in Pain: No       Post-Session Pain Report  Pain Assessment  Pain Assessment: 0-10         OBJECTIVE        Bed mobility  Supine to Sit: Moderate assistance;2 Person assistance  Sit to Supine: Moderate assistance;2 Person assistance  Comment: step by step cues needed, increased assistance needed to complete transitions. Transfers  Sit to Stand: Moderate Assistance  Stand to sit: Moderate Assistance  Comment: ANGELICA COULTER. Ambulation  Ambulation?: No (unable to sequence steps this date.)             Activity Tolerance  Activity Tolerance: Patient Tolerated treatment well          ASSESSMENT   Assessment: pt unable to sequence steps this date, pt having difficulty following cues at times. increased time for all tasks.      Discharge Recommendations:  Continue to assess pending progress, Patient would benefit from continued therapy after discharge    Goals  Long term goals  Long term goal 1: Bed mobility with SBA  Long term goal 2: Functional transfers with SBA  Long term goal 3: Amb household distance  Patient Goals   Patient goals : unable to state; dtr wishes for pt to be able to return home    PLAN    Times per week: 3-6  Safety Devices  Type of devices: All fall risk precautions in place, Call light within reach, Bed alarm in place, Left in bed     AMPAC (6 CLICK) BASIC MOBILITY  AM-PAC Inpatient Mobility Raw Score : 10      Therapy Time   Individual   Time In 1357   Time Out 1412   Minutes 15      BM/Trsf: [address] PTA, 05/20/21 at 2:52 PM         Definitions for assistance levels  Independent = pt does not require any physical supervision or assistance from another person for activity completion. Device may be needed.   Stand by assistance = pt requires verbal cues or instructions from another person, close to but not touching, to perform the activity  Minimal assistance= pt performs 75% or more of the activity; assistance is required to complete the activity  Moderate assistance= pt performs 50% of the activity; assistance is required to complete the activity  Maximal assistance = pt performs 25% of the activity; assistance is required to complete the activity  Dependent = pt requires total physical assistance to accomplish the task

## 2021-05-20 NOTE — CARE COORDINATION
MAUROW met with the pt and her daughter. Daughter lives with the pt and is her primary caregiver. Pt has dementia but until yesterday she was ambulating with a walker. Daughter cannot take her home as weak as she is now so she would like her to go to SNF to gain strength and then return home with Wilson Skinner. Daughter has SNF list and is reviewing now. Precert needed for transfer.

## 2021-05-20 NOTE — CONSULTS
Family:     Frequency of Social Gatherings with Friends and Family:     Attends Jain Services:     Active Member of Clubs or Organizations:     Attends Club or Organization Meetings:     Marital Status:    Intimate Partner Violence:     Fear of Current or Ex-Partner:     Emotionally Abused:     Physically Abused:     Sexually Abused:      History reviewed. No pertinent family history. Not on File  Current Facility-Administered Medications   Medication Dose Route Frequency Provider Last Rate Last Admin    levothyroxine (SYNTHROID) tablet 88 mcg  88 mcg Oral Daily Miki Hollins MD        sodium chloride flush 0.9 % injection 5-40 mL  5-40 mL Intravenous 2 times per day Miki Hollins MD   10 mL at 05/20/21 1006    sodium chloride flush 0.9 % injection 5-40 mL  5-40 mL Intravenous PRN Miki Hollins MD        0.9 % sodium chloride infusion  25 mL Intravenous PRN Miki Hollins MD        [facility]) tablet 12.5 mg  12.5 mg Oral Q6H PRN Miki Hollins MD        Or    ondansetron [facility]) injection 4 mg  4 mg Intravenous Q6H PRN Miki Hollins MD        polyethylene glycol [facility]) packet 17 g  17 g Oral Daily PRN Miki Hollins MD        enoxaparin (LOVENOX) injection 40 mg  40 mg Subcutaneous Daily Miki Hollins MD   40 mg at 05/19/21 1733    aspirin EC tablet 81 mg  81 mg Oral Daily Miki Hollins MD   81 mg at 05/19/21 1733    Or    aspirin suppository 300 mg  300 mg Rectal Daily Miki Hollins MD        atorvastatin (LIPITOR) tablet 80 mg  80 mg Oral Nightly Miki Hollins MD   80 mg at 05/19/21 2209        Objective:   /63   Pulse 90   Temp 98.1 °F (36.7 °C) (Oral)   Resp 18   Ht 5' 3\" (1.6 m)   Wt 134 lb 14.4 oz (61.2 kg)   LMP  (LMP Unknown)   SpO2 100%   BMI 23.90 kg/m²     Physical Exam  Vitals reviewed. Eyes:      Pupils: Pupils are equal, round, and reactive to light.    Cardiovascular:      Rate and Rhythm: Normal rate and regular rhythm. Heart sounds: No murmur heard. Pulmonary:      Effort: Pulmonary effort is normal.      Breath sounds: Normal breath sounds. Abdominal:      General: Bowel sounds are normal.   Musculoskeletal:         General: Normal range of motion. Cervical back: Normal range of motion. Skin:     General: Skin is warm. Neurological:      Mental Status: She is alert. She is disoriented. Cranial Nerves: Dysarthria present. No cranial nerve deficit. Sensory: No sensory deficit. Motor: Weakness present. No abnormal muscle tone. Coordination: Coordination abnormal. Finger-Nose-Finger Test abnormal and Heel to [facility] Test abnormal.      Gait: Gait abnormal.      Deep Tendon Reflexes: Babinski sign present on the right side. Babinski sign present on the left side. Reflex Scores:       Tricep reflexes are 2+ on the right side and 2+ on the left side. Bicep reflexes are 2+ on the right side and 2+ on the left side. Brachioradialis reflexes are 2+ on the right side and 2+ on the left side. Patellar reflexes are 2+ on the right side and 2+ on the left side. Achilles reflexes are 1+ on the right side and 1+ on the left side. Comments: In addition to this patient has significant dysarthria. She has an aphasia does not follow any commands is bradykinetic throughout with cogwheeling. Strength though remains 4/5 when she is able to perform some tests. Patient does not comprehend   Psychiatric:         Mood and Affect: Mood normal.         XR RADIUS ULNA RIGHT (2 VIEWS)    Result Date: 5/19/2021  EXAMINATION: XR RADIUS ULNA RIGHT (2 VIEWS) CLINICAL HISTORY: Forearm pain COMPARISON: None available. TECHNIQUE: AP and lateral views of the radius and ulna. FINDINGS: Decreased bone mineralization. No acute fracture. Degenerative changes of the wrist. Soft tissues appear within normal limits. No acute osseous abnormality.       CT Head WO Contrast    Result effect, midline shift, or abnormal extra-axial fluid collection. Midline structures are within normal limits. The posterior fossa is within normal limits. There is no diffusion restriction. No susceptibility artifact is identified on the gradient echo sequence. Cranial nerves 7/8 complexes appear grossly unremarkable. The visualized paranasal sinuses and bilateral mastoid air cells are clear. Degenerative changes of the cervical spine at C3-4 appears to result in in high-grade spinal canal stenosis but is incompletely evaluated on this examination. MRA brain: The bilateral distal cervical internal carotid arteries through the skull base are patent. The bilateral middle cerebral arteries through the trifurcation and opercular branches are patent. The vertebrobasilar system including the superior cerebellar and posterior cerebral arteries are patent. Neither posterior communicating artery is identified. The bilateral anterior cerebral arteries and anterior communicating artery are patent. No high-grade stenosis of the visualized cerebral vasculature. 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 MRI brain:  No acute ischemia or acute intracranial process. Generalized parenchymal volume loss and nonspecific white matter findings most compatible with chronic small vessel ischemic changes in a patient of this age. Degenerative changes of the cervical spine at C3-4 appears to result in in high-grade spinal canal stenosis but is incompletely evaluated on this examination. This would be better evaluated with MRI of the cervical spine. MRA brain: 5 mm saccular aneurysm arises from the medial aspect of the proximal ophthalmic division of the left internal carotid artery. No high-grade stenosis of the visualized cerebral vasculature.     MRI brain without contrast    Result Date: 5/19/2021  EXAM: MRI of the brain without contrast MRA of the brain without contrast History: Right-sided weakness Technique: Multiplanar multisequence MRI of the brain was performed without contrast. Axial 3-D time-of-flight images of the brain were obtained without contrast. 3-D volume rendered images were performed for evaluation of the cerebral vasculature. Comparison: CT brain May 19, 2021 Findings: MRI brain: Nonspecific white matter signal abnormality within the supratentorial white matter and kelsey is nonspecific but most compatible with chronic small vessel ischemic changes in a patient of this age. Prominence of the sulci and ventricles compatible with moderate generalized parenchymal volume loss. No edema, hemorrhage, mass, mass effect, midline shift, or abnormal extra-axial fluid collection. Midline structures are within normal limits. The posterior fossa is within normal limits. There is no diffusion restriction. No susceptibility artifact is identified on the gradient echo sequence. Cranial nerves 7/8 complexes appear grossly unremarkable. The visualized paranasal sinuses and bilateral mastoid air cells are clear. Degenerative changes of the cervical spine at C3-4 appears to result in in high-grade spinal canal stenosis but is incompletely evaluated on this examination. MRA brain: The bilateral distal cervical internal carotid arteries through the skull base are patent. The bilateral middle cerebral arteries through the trifurcation and opercular branches are patent. The vertebrobasilar system including the superior cerebellar and posterior cerebral arteries are patent. Neither posterior communicating artery is identified. The bilateral anterior cerebral arteries and anterior communicating artery are patent. No high-grade stenosis of the visualized cerebral vasculature. 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 MRI brain:  No acute ischemia or acute intracranial process. Generalized parenchymal volume loss and nonspecific white matter findings most compatible with chronic small vessel ischemic changes in a patient of this age. Degenerative changes of the cervical spine at C3-4 appears to result in in high-grade spinal canal stenosis but is incompletely evaluated on this examination. This would be better evaluated with MRI of the cervical spine. MRA brain: 5 mm saccular aneurysm arises from the medial aspect of the proximal ophthalmic division of the left internal carotid artery. No high-grade stenosis of the visualized cerebral vasculature. US CAROTID ARTERY BILATERAL    Result Date: 5/19/2021  History:  right sided weakness Technique:  US CAROTID ARTERY BILATERAL Comparison: No prior Findings: No significant plaque is seen. The common carotid arteries are tortuous as well as the internal carotid arteries. No increased velocity is seen. Normal antegrade flow is seen vertebral arteries. Doppler findings: ARTERIAL BLOOD FLOW VELOCITY RIGHT PS                                               Prox CCA 107cm/s             Mid CCA 110cm/s              Dist CCA 116cm/s              Prox %cm/s              Mid ICA 87cm/s              Dist ICA 19cm/s              Prox ECA 147cm/s              Dist VERT 75cm/s              Bulb ICA/CCA 1.0 LEFT PS Prox CCA 103cm/s              Mid CCA and ECAcm/s              Dist CCA 101cm/s              Prox ICA 78cm/s              Mid ICA 92cm/s              Dist ICA 118cm/s              Prox ECA 100cm/s              Prox VERT 47cm/s              Bulb ICA/CCA 1.2     Impression: 1. No hemodynamic significant stenosis on the right   2. No hemodynamic significant stenosis seen on the left Validated velocity measurements with angiographic measurements, velocity criteria are extrapolated from diameter data as defined by the Society of radiologist in ultrasound consensus conference radiology 2003; 229; 340-346.        Lab Results   Component Value Date WBC 7.0 05/20/2021    RBC 4.09 05/20/2021    HGB 12.0 05/20/2021    HCT 35.7 05/20/2021    MCV 87.3 05/20/2021    MCH 29.3 05/20/2021    MCHC 33.5 05/20/2021    RDW 13.3 05/20/2021     05/20/2021     Lab Results   Component Value Date     05/19/2021    K 4.1 05/19/2021     05/19/2021    CO2 25 05/19/2021    BUN 25 05/19/2021    CREATININE 0.74 05/19/2021    GFRAA >60.0 05/19/2021    LABGLOM >60.0 05/19/2021    GLUCOSE 109 05/19/2021    PROT 6.7 05/19/2021    LABALBU 3.8 05/19/2021    CALCIUM 8.9 05/19/2021    BILITOT 0.8 05/19/2021    ALKPHOS 99 05/19/2021    AST 17 05/19/2021    ALT 7 05/19/2021     No results found for: PROTIME, INR  No results found for: TSH, [id number], FOLATE, FERRITIN, IRON, TIBC, PTRFSAT, TSH, FREET4  Lab Results   Component Value Date    TRIG 62 05/20/2021    HDL 56 05/20/2021    LDLCALC 81 05/20/2021     No results found for: LABAMPH, BARBSCNU, LABBENZ, CANNAB, COCAINESCRN, LABMETH, OPIATESCREENURINE, PHENCYCLIDINESCREENURINE, PPXUR, ETOH  No results found for: LITHIUM, DILFRTOT, VALPROATE    Assessment:   Dementia of advanced degree with worsening. Patient has parkinsonian features and this may turn out to be a Lewy body dementia. She is not on any pulmonary conus or any other medication related to dementia. I am not quite sure where her work-up has been in which she is followed but she has been seen at the clinic with this findings which are not different from what I am seeing during this examination except worse. This is likely a degenerative process which is likely to continue. Patient may not be able to swallow at some point in time feeding tube or not a feeding tube needs to be discussed. RI was reviewed there is no new stroke she has extensive atrophy and he also have high-grade stenosis of the cervical spine given that she is hyperreflexic underlying spine MRI will be obtained I am not quite sure what can be done at this stage.   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 This does not require intervention. Patient has room to be treated for parkinsonism though we will wait till her evaluations are complete and if she is able to swallow we will consider carbidopa levodopa trial at least.  And to find her daughter which I was not able to do today. Medardo Smith MD, Rosanna Terrazas, American Board of Psychiatry & Neurology  Board Certified in Vascular Neurology  Board Certified in Neuromuscular Medicine  Certified in [facility]:

## 2021-05-20 NOTE — FLOWSHEET NOTE
Pt awake in bed. Sitting up for breakfast with assist daughter. Pt swallows without problem. She is alert to self, follows commands as able. Difficult to understand, but responds to questions asked. Per daughter, her baseline speech is difficult to understand, but is worse now. Generalized weakness noted. Respirations even and nonlabored. VSS. Tele SR. Echo tech at bedside for procedure. Call light in reach. Bed alarm engaged. 80 Pt assist feed for lunch. Repositioned in bed.

## 2021-05-21 ENCOUNTER — APPOINTMENT (OUTPATIENT)
Dept: MRI IMAGING | Age: 86
DRG: 057 | End: 2021-05-21
Payer: MEDICARE

## 2021-05-21 ENCOUNTER — APPOINTMENT (OUTPATIENT)
Dept: GENERAL RADIOLOGY | Age: 86
DRG: 057 | End: 2021-05-21
Payer: MEDICARE

## 2021-05-21 PROBLEM — R41.82 CHANGE IN MENTAL STATUS: Status: ACTIVE | Noted: 2021-05-21

## 2021-05-21 LAB
C-REACTIVE PROTEIN: 123.9 MG/L (ref 0–5)
SEDIMENTATION RATE, ERYTHROCYTE: 53 MM (ref 0–30)
URIC ACID, SERUM: 3 MG/DL (ref 2.4–5.7)

## 2021-05-21 PROCEDURE — 73110 X-RAY EXAM OF WRIST: CPT

## 2021-05-21 PROCEDURE — G0378 HOSPITAL OBSERVATION PER HR: HCPCS

## 2021-05-21 PROCEDURE — 84550 ASSAY OF BLOOD/URIC ACID: CPT

## 2021-05-21 PROCEDURE — 97129 THER IVNTJ 1ST 15 MIN: CPT

## 2021-05-21 PROCEDURE — 86140 C-REACTIVE PROTEIN: CPT

## 2021-05-21 PROCEDURE — 6360000002 HC RX W HCPCS: Performed by: FAMILY MEDICINE

## 2021-05-21 PROCEDURE — 6370000000 HC RX 637 (ALT 250 FOR IP): Performed by: NURSE PRACTITIONER

## 2021-05-21 PROCEDURE — 72141 MRI NECK SPINE W/O DYE: CPT

## 2021-05-21 PROCEDURE — 6370000000 HC RX 637 (ALT 250 FOR IP): Performed by: FAMILY MEDICINE

## 2021-05-21 PROCEDURE — 2580000003 HC RX 258: Performed by: FAMILY MEDICINE

## 2021-05-21 PROCEDURE — 92526 ORAL FUNCTION THERAPY: CPT

## 2021-05-21 PROCEDURE — 1210000000 HC MED SURG R&B

## 2021-05-21 PROCEDURE — APPSS30 APP SPLIT SHARED TIME 16-30 MINUTES: Performed by: NURSE PRACTITIONER

## 2021-05-21 PROCEDURE — 85652 RBC SED RATE AUTOMATED: CPT

## 2021-05-21 PROCEDURE — 96372 THER/PROPH/DIAG INJ SC/IM: CPT

## 2021-05-21 PROCEDURE — 36415 COLL VENOUS BLD VENIPUNCTURE: CPT

## 2021-05-21 PROCEDURE — 99233 SBSQ HOSP IP/OBS HIGH 50: CPT | Performed by: PSYCHIATRY & NEUROLOGY

## 2021-05-21 PROCEDURE — 73610 X-RAY EXAM OF ANKLE: CPT

## 2021-05-21 RX ORDER — FOLIC ACID 1 MG/1
1 TABLET ORAL DAILY
Status: DISCONTINUED | OUTPATIENT
Start: 2021-05-21 | End: 2021-05-26 | Stop reason: HOSPADM

## 2021-05-21 RX ADMIN — Medication 10 ML: at 22:05

## 2021-05-21 RX ADMIN — Medication 10 ML: at 10:12

## 2021-05-21 RX ADMIN — ATORVASTATIN CALCIUM 80 MG: 40 TABLET, FILM COATED ORAL at 22:05

## 2021-05-21 RX ADMIN — ASPIRIN 81 MG: 81 TABLET, COATED ORAL at 10:08

## 2021-05-21 RX ADMIN — FOLIC ACID 1 MG: 1 TABLET ORAL at 10:11

## 2021-05-21 RX ADMIN — ENOXAPARIN SODIUM 40 MG: 40 INJECTION SUBCUTANEOUS at 10:08

## 2021-05-21 RX ADMIN — LEVOTHYROXINE SODIUM 88 MCG: 88 TABLET ORAL at 10:08

## 2021-05-21 ASSESSMENT — ENCOUNTER SYMPTOMS
COLOR CHANGE: 0
VOMITING: 0
COUGH: 0
WHEEZING: 0
TROUBLE SWALLOWING: 0

## 2021-05-21 ASSESSMENT — PAIN SCALES - GENERAL: PAINLEVEL_OUTOF10: 0

## 2021-05-21 NOTE — FLOWSHEET NOTE
5/21/21 @ [address] [place] [address] Notified Dr. Delvis Ruggiero of Neurosurgery consult # [phone]

## 2021-05-21 NOTE — PROGRESS NOTES
Physical Therapy Missed Treatment   Facility/Department: [facility] MED SURG [id number]    NAME: Paulette Moreau    : 1935 (80 y.o.)  MRN: [id number]    Account: [de-identified]  Gender: female    Chart reviewed, attempted PT at 1000. Patient unavailable 2° to:    [x] Hold pending xray results for R ankle. Will attempt PT treatment again at earliest convenience.       Electronically signed by Dk Sethi PTA on 21 at 10:04 AM EDT

## 2021-05-21 NOTE — PROGRESS NOTES
[id number] [facility] Neurology Daily Progress Note  Name: Radha Roberson  Age: 80 y.o. Gender: female  CodeStatus: DNR-CCA  Allergies: Allergies have not been reviewed    Chief Complaint:Altered Mental Status (daughter called EMS for increased AMS and weakness that started this AM when she woke up )    Primary Care Provider: No primary care provider on file. InpatientTreatment Team: Treatment Team: Attending Provider: Kerry Louis MD; Consulting Physician: Rossana Caballero MD; Utilization Reviewer: Precious Millan, RN; Utilization Reviewer: Courtney Morgan, RN; Registered Nurse: Sergio Mcgovern, RN; Registered Nurse: Ranjeet Mayes RN  Admission Date: 5/19/2021      HPI   Pt seen and examined for neuro follow up for advanced dementia with parkinsonian features. Daughter currently at bedside. Patient with speech difficulties. Daughter reports she has this at baseline but it is slightly worse. Daughter reports that last week patient was able to ambulate and is now unable to. She states she is very stiff. Patient has warmth and redness and swelling and significant pain with tenderness over her right wrist and right ankle. X-ray of right radius and ulna done yesterday which was negative. No wrist x-ray. This is also new for her. Above-noted patient has advanced dementia    Vitals:    05/21/21 0507   BP: (!) 107/46   Pulse: 85   Resp: 17   Temp: 98.6 °F (37 °C)   SpO2: 96%      Review of Systems   Unable to perform ROS: Dementia   Constitutional: Positive for activity change and appetite change. Negative for fever. HENT: Negative for hearing loss and trouble swallowing. Respiratory: Negative for cough and wheezing. Cardiovascular: Negative for leg swelling. Gastrointestinal: Negative for vomiting. Musculoskeletal: Positive for arthralgias, gait problem and joint swelling. Skin: Negative for color change and rash. Neurological: Positive for speech difficulty and weakness.  Negative for tremors, seizures, syncope and facial asymmetry. Psychiatric/Behavioral: Positive for confusion. Negative for agitation. The patient is not nervous/anxious. Physical Exam  Vitals and nursing note reviewed. Constitutional:       General: She is not in acute distress. Appearance: She is not diaphoretic. HENT:      Head: Normocephalic and atraumatic. Eyes:      Pupils: Pupils are equal, round, and reactive to light. Cardiovascular:      Rate and Rhythm: Normal rate and regular rhythm. Pulmonary:      Effort: Pulmonary effort is normal. No respiratory distress. Breath sounds: Normal breath sounds. Abdominal:      General: Bowel sounds are normal.      Palpations: Abdomen is soft. Musculoskeletal:         General: Swelling and tenderness present. Cervical back: No rigidity or tenderness. Skin:     General: Skin is warm and dry. Neurological:      Mental Status: She is alert. She is disoriented. Cranial Nerves: Dysarthria present. No facial asymmetry. Motor: Weakness present. No tremor or seizure activity.       Comments: Neuro exam limited due to patient's significant confusion and dysarthria with difficulty following commands         As noted above      Medications:  Reviewed    Infusion Medications:    sodium chloride       Scheduled Medications:    levothyroxine  88 mcg Oral Daily    sodium chloride flush  5-40 mL Intravenous 2 times per day    enoxaparin  40 mg Subcutaneous Daily    aspirin  81 mg Oral Daily    Or    aspirin  300 mg Rectal Daily    atorvastatin  80 mg Oral Nightly     PRN Meds: acetaminophen, sodium chloride flush, sodium chloride, promethazine **OR** ondansetron, polyethylene glycol    Labs:   Recent Labs     05/19/21  0815 05/20/21  0511   WBC 6.3 7.0   HGB 11.9* 12.0   HCT 35.2* 35.7*    193     Recent Labs     05/19/21  0815      K 4.1      CO2 25   BUN 25*   CREATININE 0.74   CALCIUM 8.9     Recent Labs     05/19/21  0815   AST 17   ALT 7   BILITOT 0.8*   ALKPHOS 99     No results for input(s): INR in the last 72 hours. Recent Labs     05/19/21  0815   TROPONINI <0.010       Urinalysis:   Lab Results   Component Value Date    NITRU Negative 05/19/2021    WBCUA 0-2 05/19/2021    BACTERIA Negative 05/19/2021    RBCUA 0-2 05/19/2021    BLOODU TRACE 05/19/2021    SPECGRAV 1.021 05/19/2021    GLUCOSEU Negative 05/19/2021       Radiology:   Most recent    EEG No valid procedures specified. MRI of Brain No results found for this or any previous visit. Results for orders placed during the hospital encounter of 05/19/21    MRI brain without contrast    Narrative  EXAM:    MRI of the brain without contrast    MRA of the brain without contrast    History: Right-sided weakness    Technique: Multiplanar multisequence MRI of the brain was performed without contrast. Axial 3-D time-of-flight images of the brain were obtained without contrast. 3-D volume rendered images were performed for evaluation of the cerebral vasculature. Comparison: CT brain May 19, 2021    Findings:    MRI brain:    Nonspecific white matter signal abnormality within the supratentorial white matter and kelsey is nonspecific but most compatible with chronic small vessel ischemic changes in a patient of this age. Prominence of the sulci and ventricles compatible with moderate generalized parenchymal volume loss. No edema, hemorrhage, mass, mass effect, midline shift, or abnormal extra-axial fluid collection. Midline structures are within normal limits. The  posterior fossa is within normal limits. There is no diffusion restriction. No susceptibility artifact is identified on the gradient echo sequence. Cranial nerves 7/8 complexes appear grossly unremarkable. The visualized paranasal sinuses and bilateral mastoid air cells are clear.     Degenerative changes of the cervical spine at C3-4 appears to result in in high-grade spinal canal stenosis but is incompletely evaluated on this examination. MRA brain:    The bilateral distal cervical internal carotid arteries through the skull base are patent. The bilateral middle cerebral arteries through the trifurcation and opercular branches are patent. The vertebrobasilar system including the superior cerebellar and posterior cerebral arteries are patent. Neither posterior communicating artery is identified. The bilateral anterior cerebral arteries and anterior communicating artery are patent. No high-grade stenosis of the visualized cerebral vasculature. 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 Impression  MRI brain:    No acute ischemia or acute intracranial process. Generalized parenchymal volume loss and nonspecific white matter findings most compatible with chronic small vessel ischemic changes in a patient of this age. Degenerative changes of the cervical spine at C3-4 appears to result in in high-grade spinal canal stenosis but is incompletely evaluated on this examination. This would be better evaluated with MRI of the cervical spine. 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 No high-grade stenosis of the visualized cerebral vasculature. MRA of the Head and Neck: No results found for this or any previous visit. No results found for this or any previous visit.   Results for orders placed during the hospital encounter of 05/19/21    MRA head without contrast    Narrative  EXAM:    MRI of the brain without contrast    MRA of the brain without contrast    History: Right-sided weakness    Technique: Multiplanar multisequence MRI of the brain was performed without contrast. Axial 3-D time-of-flight images of the brain were obtained without contrast. 3-D volume rendered images were performed for evaluation of the cerebral vasculature. Comparison: CT brain May 19, 2021    Findings:    MRI brain:    Nonspecific white matter signal abnormality within the supratentorial white matter and kelsey is nonspecific but most compatible with chronic small vessel ischemic changes in a patient of this age. Prominence of the sulci and ventricles compatible with moderate generalized parenchymal volume loss. No edema, hemorrhage, mass, mass effect, midline shift, or abnormal extra-axial fluid collection. Midline structures are within normal limits. The  posterior fossa is within normal limits. There is no diffusion restriction. No susceptibility artifact is identified on the gradient echo sequence. Cranial nerves 7/8 complexes appear grossly unremarkable. The visualized paranasal sinuses and bilateral mastoid air cells are clear. Degenerative changes of the cervical spine at C3-4 appears to result in in high-grade spinal canal stenosis but is incompletely evaluated on this examination. MRA brain:    The bilateral distal cervical internal carotid arteries through the skull base are patent. The bilateral middle cerebral arteries through the trifurcation and opercular branches are patent. The vertebrobasilar system including the superior cerebellar and posterior cerebral arteries are patent. Neither posterior communicating artery is identified. The bilateral anterior cerebral arteries and anterior communicating artery are patent. No high-grade stenosis of the visualized cerebral vasculature. 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 Impression  MRI brain:    No acute ischemia or acute intracranial process. Generalized parenchymal volume loss and nonspecific white matter findings most compatible with chronic small vessel ischemic changes in a patient of this age.     Degenerative changes of the cervical spine at C3-4 appears to result in in high-grade spinal canal stenosis but is incompletely evaluated on this examination. This would be better evaluated with MRI of the cervical spine. 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 No high-grade stenosis of the visualized cerebral vasculature. CT of the Head: Results for orders placed during the hospital encounter of 05/19/21    CT Head WO Contrast    Narrative  EXAMINATION: CT of the brain without contrast    HISTORY: Altered mental status    COMPARISON: None available    TECHNIQUE: Multiple contiguous axial images were obtained of the brain from the skull base through the vertex. Multiplanar reformats were obtained. FINDINGS:    Prominence of the sulci and ventricles compatible with mild generalized parenchymal volume loss. Gray-white matter differentiation is preserved. Areas of bilateral supratentorial white matter hypoattenuation are nonspecific but most likely related to  chronic small vessel ischemic changes in a patient of this age. No acute hemorrhage or abnormal extra-axial fluid collection. No mass effect or midline shift. The visualized paranasal sinuses and mastoid air cells are clear. Calvarium is intact. Impression  No acute intracranial process. Generalized parenchymal volume loss and nonspecific white matter findings most compatible with chronic small vessel ischemic changes in a patient of this age. All CT scans at this facility use dose modulation, iterative reconstruction, and/or weight based dosing when appropriate to reduce radiation dose to as low as reasonably achievable. No results found for this or any previous visit. No results found for this or any previous visit. Carotid duplex: No results found for this or any previous visit. No results found for this or any previous visit.   Results for orders high-grade stenosis of the cervical spine given that she is hyperreflexic underlying spine MRI will be obtained I am not quite sure what can be done at this stage. 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 This does not require intervention. Patient has room to be treated for parkinsonism though we will wait till her evaluations are complete and if she is able to swallow we will consider carbidopa levodopa trial at least.  And to find her daughter which I was not able to do today. Patient being seen for advanced dementia with parkinsonian features. She also has hyperreflexia with concern for cervical spine pathology. Consideration being given to treatment with carbidopa levodopa. Patient does have baseline hypotension so this will have to be approached carefully should we initiate treatment with this. With erythema and significant tenderness over right medial wrist.  Will obtain x-ray, uric acid, sed rate and CRP. She also has pain and tenderness and slight swelling over the right ankle. Will obtain x-ray. Folate deficiency noted. Folic acid started. I have personally performed a face to face diagnostic evaluation on this patient, reviewed all data and investigations, and am the sole provider of all clinical decisions on the neurological status of this patient. She has considerable weakness and inability to walk. 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 Patient's MRI is reviewed and patient actually has marked spinal cord stenosis explaining patient's walking inabilities in addition to what ever is mentioned above. Medardo Murrell MD, Manuel Ba, American Board of Psychiatry & Neurology  Board Certified in Vascular Neurology  Board Certified in Neuromuscular Medicine  Certified in Neurorehabilitation       Collaborating physicians: Dr Lore Murrell    Electronically signed by VIDYA Castillo - CNP on 5/21/2021 at 9:36 AM

## 2021-05-21 NOTE — PROGRESS NOTES
[facility]   Facility/Department: [facility]  Speech Language Pathology  Treatment Note  Elyse Dexter  1935  [id number]    Medical Dx: Right sided weakness [R53.1]  Speech Dx: Dysphagia and Cognitive Linguistic Impairment        5/21/2021      Subjective:  Cooperative and Confused        Interventions used this date:  Speech Production, Expressive Language, Receptive Language, Dysphagia Treatment and Oral Motor Treatment    Objective/Assessment:  Patient progressing towards goals:  Short-term Goals  Timeframe for Short-term Goals: 1 week    Goal 1: To address pt's cognitive deficits and promote orientation, pt will state name of facility, time within 1 hour, reason in hospital, current month and year with 75% accuracy following a model, with use of external aid. 3/5  - Patient unable to state reason for hospital stay and unable to state year. Goal 2: Pt will complete confrontational naming tasks with 75% accuracy following a model to help the patient express her basic wants and needs. 100% accuracy  - Patient with poor intelligibility and low volume during this task. Daughter acted as  and stated that she knows patient is difficult to understand but she is familiar with her speech and able to interpret her responses. Without daughter interpreting, SLP was able to determine 60% accuracy with task. Goal 3: Pt will follow 1-step directions given orally with 75% accuracy with mod cues to increase the pt's ability to follow directions provided by caregivers for safe follow through with ADLs. 80% accuracy with 1 repetition due to patient being hard of hearing. Goal 4: Patient will match common objects to pictures with 75% accuracy with mod cues and following a model as needed to increase patient's ability to use an alternative communication method to communicate wants/needs in the presence of severe motor speech deficits. Not targeted.      Goal 5: Pt will complete OM drills paired with speech sounds to improve speech intelligibility x5/each and expression of his/her complex thoughts, needs, feelings, and ideas. 40% accuracy utilizing bilabials for lip closure. - SLP provided education to patient and daughter regarding this task and asked to practice throughout the day. Daughter and patient observed to be practicing /m/ when SLP left room. Long-term Goals  Timeframe for Long-term Goals: 1 week  Goal 1: Patient will demonstrate functional speech intelligibility in all opportunities with max assist in order to effectively communicate her wants and needs. Goal 2: Patient will demonstrate functional cognitive-linguistic abilities in all opportunities with max assist in order to safely complete ADLs. Short-term Goals  Timeframe for Short-term Goals: 1 week    Goal 1: Pt will tolerate the recommended diet level without overt s/s of aspiration. Patient observed to consume minced/moist consistency. Patient is total feed and was given very small bite by SLP as initial trial. Patient demonstrated timely swallow onset and no residue. SLP provided a slightly larger bite. Patient demonstrated impaired mastication and slight residue which was cleared with liquid wash. Patient to continue with minced/moist and thin liquid diet with strict use of compensatory strategies. Daughter verbalized understanding. Goal 2: Educate family/caregiver regarding recommended compensatory swallow strategies to decrease pt risk of aspiration. SLP provided education to daughter regarding upright positioning, slow feeding, small bites/sips, and liquid wash to clear residue. Daughter verbalized understanding. Dysphagia Goals:  The patient will tolerate recommended diet without observed clinical signs of aspiration  Compensatory Swallowing Strategies: Small bites/sips, Upright as possible for all oral intake, Eat/Feed slowly, Total feed      Treatment/Activity Tolerance:  Patient limited by other medical complications    Plan:  Continue per POC    Pain Assessment:  Pre-Treatment  Pain assessment: 0-10  Pain level: unable to rate  Intervention:  Patient demonstrated no s/s of pain. Post-Treatment  Pain assessment: 0-10  Pain level: unable to rate  Intervention:  Patient demonstrated no s/s of pain. Patient/Caregiver Education:  Patient educated on session and progression towards goals. Caregiver education on session and progress towards goals. Caregiver stated verbal understanding of directions. Safety Devices:   All fall risk precautions in place    Therapy Time  SLP Individual Minutes  Time In: 1016  Time Out: 1036  Minutes: 20     10:16-10:26: Cognition/Speech  10:26-10:30: Swallowing      Signature: Electronically signed by MARTIN Quintero on 5/21/2021 at 11:31 AM

## 2021-05-21 NOTE — PROGRESS NOTES
Patient was cleaned and repositioned in the bed. Patient has redness on her sacrum and groin. Zinc ointment applied and patient's head of the bed is below 20. Patient able to follow some command but speech is very difficult to understand. Patient able to drink fluids with her medication with no issues swallowing. Bed alarm on and rails up times three for safety.

## 2021-05-21 NOTE — PROGRESS NOTES
Shift assessment completed. Pt is alert to self, neuro assessment shows weakness BLE, BUE and slurred speech. Lung sounds regular, unlabored, chest expansion symmetrical but diminished. Heart sounds WDL. Bowel sounds present x4 quadrants. Pt is resting comfortably in bed, call light within reach.

## 2021-05-21 NOTE — PLAN OF CARE
Problem: Pain:  Goal: Pain level will decrease  Description: Pain level will decrease  Outcome: Ongoing  Goal: Control of acute pain  Description: Control of acute pain  Outcome: Ongoing  Goal: Control of chronic pain  Description: Control of chronic pain  Outcome: Ongoing     Problem: IP BALANCE  Goal: LTG - Patient will maintain balance to allow for safe/functional mobility  Outcome: Ongoing     Problem: Falls - Risk of:  Goal: Will remain free from falls  Description: Will remain free from falls  Outcome: Ongoing  Goal: Absence of physical injury  Description: Absence of physical injury  Outcome: Ongoing     Problem: Skin Integrity:  Goal: Will show no infection signs and symptoms  Description: Will show no infection signs and symptoms  Outcome: Ongoing  Goal: Absence of new skin breakdown  Description: Absence of new skin breakdown  Outcome: Ongoing     Problem: IP MOBILITY  Goal: LTG - patient will demonstrate safe mobility requirements  Outcome: Ongoing     Problem: IP SWALLOWING  Goal: LTG - patient will tolerate the least restrictive diet consistency to allow for safe consumption of daily meals  Outcome: Ongoing     Problem: IP COMMUNICATION/DYSARTHRIA  Goal: LTG - patient will utilize compensatory intervention for intelligibility  Outcome: Ongoing

## 2021-05-22 PROCEDURE — 99233 SBSQ HOSP IP/OBS HIGH 50: CPT | Performed by: PSYCHIATRY & NEUROLOGY

## 2021-05-22 PROCEDURE — 97535 SELF CARE MNGMENT TRAINING: CPT

## 2021-05-22 PROCEDURE — 6370000000 HC RX 637 (ALT 250 FOR IP): Performed by: FAMILY MEDICINE

## 2021-05-22 PROCEDURE — 6370000000 HC RX 637 (ALT 250 FOR IP): Performed by: NURSE PRACTITIONER

## 2021-05-22 PROCEDURE — 96372 THER/PROPH/DIAG INJ SC/IM: CPT

## 2021-05-22 PROCEDURE — 1210000000 HC MED SURG R&B

## 2021-05-22 PROCEDURE — G0378 HOSPITAL OBSERVATION PER HR: HCPCS

## 2021-05-22 PROCEDURE — 2580000003 HC RX 258: Performed by: FAMILY MEDICINE

## 2021-05-22 PROCEDURE — 6360000002 HC RX W HCPCS: Performed by: FAMILY MEDICINE

## 2021-05-22 RX ADMIN — FOLIC ACID 1 MG: 1 TABLET ORAL at 10:04

## 2021-05-22 RX ADMIN — ACETAMINOPHEN 650 MG: 325 TABLET ORAL at 13:42

## 2021-05-22 RX ADMIN — Medication 10 ML: at 10:06

## 2021-05-22 RX ADMIN — ATORVASTATIN CALCIUM 80 MG: 40 TABLET, FILM COATED ORAL at 21:33

## 2021-05-22 RX ADMIN — ENOXAPARIN SODIUM 40 MG: 40 INJECTION SUBCUTANEOUS at 10:04

## 2021-05-22 RX ADMIN — ACETAMINOPHEN 650 MG: 325 TABLET ORAL at 18:18

## 2021-05-22 RX ADMIN — ASPIRIN 81 MG: 81 TABLET, COATED ORAL at 10:04

## 2021-05-22 RX ADMIN — Medication 10 ML: at 21:34

## 2021-05-22 RX ADMIN — LEVOTHYROXINE SODIUM 88 MCG: 88 TABLET ORAL at 10:05

## 2021-05-22 ASSESSMENT — ENCOUNTER SYMPTOMS
TROUBLE SWALLOWING: 0
WHEEZING: 0
VOMITING: 0
COUGH: 0
COLOR CHANGE: 0

## 2021-05-22 ASSESSMENT — PAIN DESCRIPTION - LOCATION: LOCATION: ANKLE

## 2021-05-22 ASSESSMENT — PAIN SCALES - GENERAL
PAINLEVEL_OUTOF10: 4
PAINLEVEL_OUTOF10: 0

## 2021-05-22 NOTE — CONSULTS
Patient Name: Frank George Date: 2021  8:03 AM  MR #: [id number]  : 1935    Attending Physician: Feng Chery MD  Reason for consult: Cervical spinal stenosis  History of Presenting Illness and Review of Erlin Arshad is a 80 y.o. female on hospital day 2    Patient was a seen on 200 dictated on  [id number]. History Obtained From:  patient, family member -daughter  Patient is a 80-year-old female whom I was asked to see with MRI finding of cervical spine stenosis. Was admitted with new onset of weakness x1 episode witnessed by her daughter who is a caregiver as a nurse as well. Her past medical history noted for Alzheimer's disease with progressive dementia living at home. Dementia has been slowly declining with a recent increasing in this rapidity. On the day of admission noted to have a sudden weakness about the fall for which patient was taken to the ER. Patient increasing confusion as well. According to note this weakness has been going on for about 3 days of constant H&P. Denies any history of hitting the head or neck during the with the collapse meant. Patient complains of left arm pain but no right arm or lower extremity complaints. No history of bowel bladder incontinence. Because of her significant dementia most of history was obtained to the chart and the daughter      History:      Past Medical History:   Diagnosis Date    Alzheimer disease ([facility] [address]     Thyroid disease      History reviewed. No pertinent surgical history. Family History  History reviewed. No pertinent family history. [] Unable to obtain due to ventilated and/ or neurologic status    Social History     Socioeconomic History    Marital status:       Spouse name: Not on file    Number of children: Not on file    Years of education: Not on file    Highest education level: Not on file   Occupational History    Not on file   Tobacco Use    Smoking status: Never Smoker Substance and Sexual Activity    Alcohol use: Not on file    Drug use: Not on file    Sexual activity: Not on file   Other Topics Concern    Not on file   Social History Narrative    Not on file     Social Determinants of Health     Financial Resource Strain:     Difficulty of Paying Living Expenses:    Food Insecurity:     Worried About Running Out of Food in the Last Year:     [address] in the Last Year:    Transportation Needs:     Lack of Transportation (Medical):  Lack of Transportation (Non-Medical):    Physical Activity:     Days of Exercise per Week:     Minutes of Exercise per Session:    Stress:     Feeling of Stress :    Social Connections:     Frequency of Communication with Friends and Family:     Frequency of Social Gatherings with Friends and Family:     Attends [employer]regation Services:     Active Member of Clubs or Organizations:     Attends Club or Organization Meetings:     Marital Status:    Intimate Partner Violence:     Fear of Current or Ex-Partner:     Emotionally Abused:     Physically Abused:     Sexually Abused:       [] Unable to obtain due to ventilated and/ or neurologic status      Home Medications:      Medications Prior to Admission: levothyroxine (SYNTHROID) 88 MCG tablet, Take 88 mcg by mouth Daily    Current Hospital Medications:     Scheduled Meds:   folic acid  1 mg Oral Daily    levothyroxine  88 mcg Oral Daily    sodium chloride flush  5-40 mL Intravenous 2 times per day    enoxaparin  40 mg Subcutaneous Daily    aspirin  81 mg Oral Daily    Or    aspirin  300 mg Rectal Daily    atorvastatin  80 mg Oral Nightly     Continuous Infusions:   sodium chloride       PRN Meds:.acetaminophen, sodium chloride flush, sodium chloride, promethazine **OR** ondansetron, polyethylene glycol  .  sodium chloride          Allergies:     No Known Allergies     Physical Exam     Clinically appears to be older than her stated age.   Cachexia appearing overall body habitus. Attentive but none comprehensible verbal response with dysarthria. Slow but positive tracking is noted    Not oriented. Slow in speech. Incomprehensible most of the time    Not following commands but moving all 4 extremities due to pain localizing. Keeps her eyes open. 5 GCS of 4+2+5/11    MRI of cervical spine and brain noted  Objective Findings:     Vitals:   Vitals:    05/21/21 0642 05/21/21 2151 05/22/21 0652 05/22/21 1455   BP:  (!) 110/50 (!) 99/51    Pulse:  85 86    Resp:  20 20    Temp:  98.2 °F (36.8 °C) 98.8 °F (37.1 °C)    TempSrc:  Temporal Axillary    SpO2:  91% 93%    Weight: 132 lb 8 oz (60.1 kg)   142 lb 6.7 oz (64.6 kg)   Height:              Laboratory, Microbiology, Pathology, Radiology, Cardiology, Medications and Transcriptions reviewed  Scheduled Meds:   folic acid  1 mg Oral Daily    levothyroxine  88 mcg Oral Daily    sodium chloride flush  5-40 mL Intravenous 2 times per day    enoxaparin  40 mg Subcutaneous Daily    aspirin  81 mg Oral Daily    Or    aspirin  300 mg Rectal Daily    atorvastatin  80 mg Oral Nightly     Continuous Infusions:   sodium chloride         Recent Labs     05/20/21  0511   WBC 7.0   HGB 12.0   HCT 35.7*   MCV 87.3        No results for input(s): NA, K, CL, CO2, PHOS, BUN, CREATININE in the last 72 hours. Invalid input(s): CA  No results for input(s): AST, ALT, ALB, BILIDIR, BILITOT, ALKPHOS in the last 72 hours. No results for input(s): LIPASE, AMYLASE in the last 72 hours. No results for input(s): PROT, INR in the last 72 hours.   Echocardiogram complete 2D with doppler with color    Result Date: 5/20/2021  Transthoracic Echocardiography Report (TTE)  Demographics   Patient Name    Nancy Bermudez Gender               Female   Patient Number  [id number]       Race                                                   Ethnicity   Visit Number    [id number]      Room Number          X024   Corporate ID Date of Study        05/20/2021   Accession       [id number]     Referring Physician  Number   Date of Birth   1935     Sonographer          Isaac Back   Age             80 year(s)     Interpreting         [facility])                                 Physician            Cardiology                                                      Kole Duarte MD  Procedure Type of Study   TTE procedure:ECHO COMPLETE 2D W/DOP W/COLOR. Procedure Date Date: 05/20/2021 Start: 10:32 AM Study Location: Portable Technical Quality: Adequate visualization Indications:LVF. Patient Status: Routine Height: 63 inches Weight: 134 pounds BSA: 1.63 m^2 BMI: 23.74 kg/m^2 BP: 122/80 mmHg  Conclusions   Summary  Normal tricuspid valve structure and function. 1+ TR  RVSP 48 mmHg  Normal left ventricle structure and function. Left ventricular ejection fraction is visually estimated at 55%. Pseudonormal filling pattern noted. Signature   ----------------------------------------------------------------  Electronically signed by Kole Duarte MD(Interpreting  physician) on 05/20/2021 03:26 PM  ----------------------------------------------------------------   Findings  Left Ventricle Normal left ventricle structure and function. Left ventricular ejection fraction is visually estimated at 55%. Pseudonormal filling pattern noted. Right Ventricle Normal right ventricle structure and function. Normal right ventricle systolic pressure. Left Atrium Normal left atrium. Right Atrium Normal right atrium. Mitral Valve Mitral annular calcification is present. Tricuspid Valve Normal tricuspid valve structure and function. 1+ TR RVSP 48 mmHg Aortic Valve Aortic valve leaflets are mildly thickened. Pulmonic Valve The pulmonic valve was not well visualized . Pericardial Effusion small Pericardial Effusion Pleural Effusion No evidence of pleural effusion. Aorta \ Miscellaneous The aorta is within normal limits.  M-Mode Measurements (cm)   LVIDd: 3.16 cm LVIDs: 1.93 cm  IVSd: 1.18 cm                          IVSs: 1.17 cm  LVPWd: 0.92 cm                         LVPWs: 1.06 cm  Rt. Vent.  Dimension: 1.64 cm           AO Root Dimension: 2.95 cm                                         ACS: 1.55 cm                                         LA: 2.47 cm                                         LVOT: 2.01 cm  Doppler Measurements:   AV Velocity:0.03 m/s                    MV Peak E-Wave: 1.12 m/s  AV Peak Gradient: 7.58 mmHg             MV Peak A-Wave: 0.83 m/s  AV Mean Gradient: 4.04 mmHg  AV Area (Continuity):2.92 cm^2  TR Velocity:3.06 m/s                    Estimated RAP:10 mmHg  TR Gradient:37.46 mmHg                  RVSP:47.46 mmHg  Valves  Mitral Valve   Peak E-Wave: 1.12 m/s                  Peak A-Wave: 0.83 m/s                                         E/A Ratio: 1.34                                         Peak Gradient: 4.98 mmHg                                         Deceleration Time: 187 msec   Tissue Doppler   E' Septal Velocity: 0.07 m/s  E' Lateral Velocity: 0.06 m/s   Aortic Valve   Peak Velocity: 1.38 m/s                Mean Velocity: 0.94 m/s  Peak Gradient: 7.58 mmHg               Mean Gradient: 4.04 mmHg  Area (continuity): 2.92 cm^2           Area (2D): 2.9 cm^2  AV VTI: 25.76 cm   Cusp Separation: 1.55 cm   Tricuspid Valve   Estimated RVSP: 47.46 mmHg              Estimated RAP: 10 mmHg  TR Velocity: 3.06 m/s                   TR Gradient: 37.46 mmHg   Pulmonic Valve   Peak Velocity: 1.16 m/s           Peak Gradient: 5.41 mmHg                                    Estimated PASP: 47.46 mmHg   LVOT   Peak Velocity: 1.31 m/s              Mean Velocity: 0.9 m/s  Peak Gradient: 6.82 mmHg             Mean Gradient: 3.71 mmHg  LVOT Diameter: 2.01 cm               LVOT VTI: 23.75 cm  Structures  Left Atrium   LA Dimension: 2.47 cm                        LA Area: 13.62 cm^2  LA/Aorta: 0.84  LA Volume/Index: 24.99 ml /15 m^2   Left Ventricle Diastolic Dimension: 0.44 cm          Systolic Dimension: 2.89 cm  Septum Diastolic: 9.36 cm             Septum Systolic: 3.82 cm  PW Diastolic: 0.77 cm                 PW Systolic: 4.89 cm                                        FS: 38.9 %  LV EDV/LV EDV Index: 39.66 ml/24 m^2  LV ESV/LV ESV Index: 11.59 ml/7 m^2  EF Calculated: 70.8 %                 LV Length: 6.2 cm   LVOT Diameter: 2.01 cm   Right Atrium   RA Systolic Pressure: 10 mmHg   Right Ventricle   Diastolic Dimension: 1.39 cm                                    RV Systolic Pressure: 43.34 mmHg  Aorta/ Miscellaneous Aorta   Aortic Root: 2.95 cm  LVOT Diameter: 2.01 cm      XR RADIUS ULNA RIGHT (2 VIEWS)    Result Date: 5/19/2021  EXAMINATION: XR RADIUS ULNA RIGHT (2 VIEWS) CLINICAL HISTORY: Forearm pain COMPARISON: None available. TECHNIQUE: AP and lateral views of the radius and ulna. FINDINGS: Decreased bone mineralization. No acute fracture. Degenerative changes of the wrist. Soft tissues appear within normal limits. No acute osseous abnormality. XR WRIST RIGHT (MIN 3 VIEWS)    Result Date: 5/21/2021  EXAMINATION: XR WRIST RIGHT (MIN 3 VIEWS), 5/21/2021 11:13 AM CLINICAL HISTORY:  pain, edema COMPARISON: None TECHNIQUE: FINDINGS:  The visualized bones are demineralized which may be secondary to osteoporosis/osteopenia. There is no acute  fracture or subluxation. There is severe degenerative changes of the wrist with joint space narrowing especially involving the first  carpal metacarpal joint. There are also intra-articular calcifications and there are cysts in the scaphoid. The soft tissues are within normal limits. There are no radiopaque foreign bodies. There are no acute osseous injuries. 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 XR ANKLE RIGHT (MIN 3 VIEWS)    Result Date: 5/21/2021  XR ANKLE RIGHT (MIN 3 VIEWS) : 5/21/2021 CLINICAL HISTORY:  pain . COMPARISON: None available. obtained without contrast. 3-D volume rendered images were performed for evaluation of the cerebral vasculature. Comparison: CT brain May 19, 2021 Findings: MRI brain: Nonspecific white matter signal abnormality within the supratentorial white matter and kelsey is nonspecific but most compatible with chronic small vessel ischemic changes in a patient of this age. Prominence of the sulci and ventricles compatible with moderate generalized parenchymal volume loss. No edema, hemorrhage, mass, mass effect, midline shift, or abnormal extra-axial fluid collection. Midline structures are within normal limits. The posterior fossa is within normal limits. There is no diffusion restriction. No susceptibility artifact is identified on the gradient echo sequence. Cranial nerves 7/8 complexes appear grossly unremarkable. The visualized paranasal sinuses and bilateral mastoid air cells are clear. Degenerative changes of the cervical spine at C3-4 appears to result in in high-grade spinal canal stenosis but is incompletely evaluated on this examination. MRA brain: The bilateral distal cervical internal carotid arteries through the skull base are patent. The bilateral middle cerebral arteries through the trifurcation and opercular branches are patent. The vertebrobasilar system including the superior cerebellar and posterior cerebral arteries are patent. Neither posterior communicating artery is identified. The bilateral anterior cerebral arteries and anterior communicating artery are patent. No high-grade stenosis of the visualized cerebral vasculature. 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 MRI brain:  No acute ischemia or acute intracranial process. Generalized parenchymal volume loss and nonspecific white matter findings most compatible with chronic small vessel ischemic changes in a patient of this age.   Degenerative changes of the cervical spine at C3-4 appears to result in in high-grade spinal canal stenosis but is incompletely evaluated on this examination. This would be better evaluated with MRI of the cervical spine. MRA brain: 5 mm saccular aneurysm arises from the medial aspect of the proximal ophthalmic division of the left internal carotid artery. No high-grade stenosis of the visualized cerebral vasculature. MRI CERVICAL SPINE WO CONTRAST    Result Date: 5/21/2021  MRI CERVICAL SPINE WO CONTRAST : 5/21/2021 CLINICAL HISTORY: Right-sided weakness. Evaluate for cord compression . COMPARISON: None available. TECHNIQUE: Multiplanar MR imaging of the cervical spine was performed FINDINGS: Motion artifact mildly degrades the study. The spine is visualized from the craniovertebral junction through the T3-4 level on the sagittal sequences. The visualized spinal cord is normal and signal and caliber. There is no significant subluxation, fracture or acute findings identified. At the C2-3 level, there is mild diffuse disc bulging, mild posterior lateral endplate osteophytosis, and mild to moderate hypertrophic facet changes, without significant central spinal stenosis or neural foraminal narrowing. At the C3-4 level, there is moderate diffuse disc bulging, moderate hypertrophic facet and ligamentum flavum changes, which results in moderate to marked central spinal stenosis. At the C4-5 level, there is moderate to marked disc space narrowing with near complete degenerative appearing bony fusion, mild posterior lateral endplate osteophytosis and hypertrophic facet changes, without central spinal stenosis, or neural foraminal narrowing. From the C5-C6 through upper thoracic levels.  There is mild to moderate diffuse disc bulging, borderline to mild disc space narrowing, mild to moderate posterolateral endplate osteophytosis and hypertrophic facet changes, without significant central spinal stenosis, neural foraminal narrowing, or discrete disc herniations. MODERATE CERVICAL SPONDYLOSIS, AS DESCRIBED IN DETAIL. MODERATE TO MARKED CENTRAL SPINAL STENOSIS C3-4. NO OTHER CENTRAL SPINAL STENOSIS, NEURAL FORAMINAL NARROWING, SUBLUXATION, OR SIGNIFICANT DISC HERNIATIONS. MRI brain without contrast    Result Date: 5/19/2021  EXAM: MRI of the brain without contrast MRA of the brain without contrast History: Right-sided weakness Technique: Multiplanar multisequence MRI of the brain was performed without contrast. Axial 3-D time-of-flight images of the brain were obtained without contrast. 3-D volume rendered images were performed for evaluation of the cerebral vasculature. Comparison: CT brain May 19, 2021 Findings: MRI brain: Nonspecific white matter signal abnormality within the supratentorial white matter and kelsey is nonspecific but most compatible with chronic small vessel ischemic changes in a patient of this age. Prominence of the sulci and ventricles compatible with moderate generalized parenchymal volume loss. No edema, hemorrhage, mass, mass effect, midline shift, or abnormal extra-axial fluid collection. Midline structures are within normal limits. The posterior fossa is within normal limits. There is no diffusion restriction. No susceptibility artifact is identified on the gradient echo sequence. Cranial nerves 7/8 complexes appear grossly unremarkable. The visualized paranasal sinuses and bilateral mastoid air cells are clear. Degenerative changes of the cervical spine at C3-4 appears to result in in high-grade spinal canal stenosis but is incompletely evaluated on this examination. MRA brain: The bilateral distal cervical internal carotid arteries through the skull base are patent. The bilateral middle cerebral arteries through the trifurcation and opercular branches are patent. The vertebrobasilar system including the superior cerebellar and posterior cerebral arteries are patent. Neither posterior communicating artery is identified.  The on the right   2. No hemodynamic significant stenosis seen on the left Validated velocity measurements with angiographic measurements, velocity criteria are extrapolated from diameter data as defined by the Society of radiologist in ultrasound consensus conference radiology 2003; 229; 340-346. Impression:   49-year-old female with a significant dementia progressive now with the MRI finding of cervical spinal stenosis attributing to her gait issues as well as some strength issues. Given her mental status and significant in low functional status, I did not recommend any surgical intervention. We will have a family meeting tomorrow on 56 with her daughter who also is a caregiver. Comments: Thank you for allowing us to participate in the care of this patient. Will continue to follow. Please call if questions or concerns arise.     Electronically signed by Pravin Campbell MD on 5/22/2021 at 5:28 PM

## 2021-05-22 NOTE — PROGRESS NOTES
Patient alert and cooperative. Difficult to understand but the patient does her best to answer simple questions. Patient took her medication with a full glass of water with no choking. Patient repositioned in the bed to decrease pressure on her sacrum. Bed alarm on and rails up times two. Call light with patient. Assessment complete.

## 2021-05-22 NOTE — PROGRESS NOTES
arthralgias, gait problem and joint swelling. Skin: Negative for color change and rash. Neurological: Positive for speech difficulty and weakness. Negative for tremors, seizures, syncope and facial asymmetry. Psychiatric/Behavioral: Positive for confusion. Negative for agitation. The patient is not nervous/anxious. Same as noted above  Physical Exam  Vitals and nursing note reviewed. Constitutional:       General: She is not in acute distress. Appearance: She is not diaphoretic. HENT:      Head: Normocephalic and atraumatic. Eyes:      Pupils: Pupils are equal, round, and reactive to light. Cardiovascular:      Rate and Rhythm: Normal rate and regular rhythm. Pulmonary:      Effort: Pulmonary effort is normal. No respiratory distress. Breath sounds: Normal breath sounds. Abdominal:      General: Bowel sounds are normal.      Palpations: Abdomen is soft. Musculoskeletal:         General: Swelling and tenderness present. Cervical back: No rigidity or tenderness. Skin:     General: Skin is warm and dry. Neurological:      Mental Status: She is alert. She is disoriented. Cranial Nerves: Dysarthria present. No facial asymmetry. Motor: Weakness present. No tremor or seizure activity. Comments: Neuro exam limited due to patient's significant confusion and dysarthria with difficulty following commands         As noted above see no new changes.       Medications:  Reviewed    Infusion Medications:    sodium chloride       Scheduled Medications:    folic acid  1 mg Oral Daily    levothyroxine  88 mcg Oral Daily    sodium chloride flush  5-40 mL Intravenous 2 times per day    enoxaparin  40 mg Subcutaneous Daily    aspirin  81 mg Oral Daily    Or    aspirin  300 mg Rectal Daily    atorvastatin  80 mg Oral Nightly     PRN Meds: acetaminophen, sodium chloride flush, sodium chloride, promethazine **OR** ondansetron, polyethylene glycol    Labs:   Recent Labs 05/20/21  0511   WBC 7.0   HGB 12.0   HCT 35.7*        No results for input(s): NA, K, CL, CO2, BUN, CREATININE, CALCIUM, PHOS in the last 72 hours. Invalid input(s): MAGNES  No results for input(s): AST, ALT, BILIDIR, BILITOT, ALKPHOS in the last 72 hours. No results for input(s): INR in the last 72 hours. No results for input(s): [place] in the last 72 hours. Urinalysis:   Lab Results   Component Value Date    NITRU Negative 05/19/2021    WBCUA 0-2 05/19/2021    BACTERIA Negative 05/19/2021    RBCUA 0-2 05/19/2021    BLOODU TRACE 05/19/2021    SPECGRAV 1.021 05/19/2021    GLUCOSEU Negative 05/19/2021       Radiology:   Most recent    EEG No valid procedures specified. MRI of Brain No results found for this or any previous visit. Results for orders placed during the hospital encounter of 05/19/21    MRI brain without contrast    Narrative  EXAM:    MRI of the brain without contrast    MRA of the brain without contrast    History: Right-sided weakness    Technique: Multiplanar multisequence MRI of the brain was performed without contrast. Axial 3-D time-of-flight images of the brain were obtained without contrast. 3-D volume rendered images were performed for evaluation of the cerebral vasculature. Comparison: CT brain May 19, 2021    Findings:    MRI brain:    Nonspecific white matter signal abnormality within the supratentorial white matter and kelsey is nonspecific but most compatible with chronic small vessel ischemic changes in a patient of this age. Prominence of the sulci and ventricles compatible with moderate generalized parenchymal volume loss. No edema, hemorrhage, mass, mass effect, midline shift, or abnormal extra-axial fluid collection. Midline structures are within normal limits. The  posterior fossa is within normal limits. There is no diffusion restriction. No susceptibility artifact is identified on the gradient echo sequence.     Cranial nerves 7/8 complexes contrast    History: Right-sided weakness    Technique: Multiplanar multisequence MRI of the brain was performed without contrast. Axial 3-D time-of-flight images of the brain were obtained without contrast. 3-D volume rendered images were performed for evaluation of the cerebral vasculature. Comparison: CT brain May 19, 2021    Findings:    MRI brain:    Nonspecific white matter signal abnormality within the supratentorial white matter and kelsey is nonspecific but most compatible with chronic small vessel ischemic changes in a patient of this age. Prominence of the sulci and ventricles compatible with moderate generalized parenchymal volume loss. No edema, hemorrhage, mass, mass effect, midline shift, or abnormal extra-axial fluid collection. Midline structures are within normal limits. The  posterior fossa is within normal limits. There is no diffusion restriction. No susceptibility artifact is identified on the gradient echo sequence. Cranial nerves 7/8 complexes appear grossly unremarkable. The visualized paranasal sinuses and bilateral mastoid air cells are clear. Degenerative changes of the cervical spine at C3-4 appears to result in in high-grade spinal canal stenosis but is incompletely evaluated on this examination. MRA brain:    The bilateral distal cervical internal carotid arteries through the skull base are patent. The bilateral middle cerebral arteries through the trifurcation and opercular branches are patent. The vertebrobasilar system including the superior cerebellar and posterior cerebral arteries are patent. Neither posterior communicating artery is identified. The bilateral anterior cerebral arteries and anterior communicating artery are patent. No high-grade stenosis of the visualized cerebral vasculature. 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     Impression  MRI brain:    No acute ischemia or acute intracranial process. Generalized parenchymal volume loss and nonspecific white matter findings most compatible with chronic small vessel ischemic changes in a patient of this age. Degenerative changes of the cervical spine at C3-4 appears to result in in high-grade spinal canal stenosis but is incompletely evaluated on this examination. This would be better evaluated with MRI of the cervical spine. 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 No high-grade stenosis of the visualized cerebral vasculature. CT of the Head: Results for orders placed during the hospital encounter of 05/19/21    CT Head WO Contrast    Narrative  EXAMINATION: CT of the brain without contrast    HISTORY: Altered mental status    COMPARISON: None available    TECHNIQUE: Multiple contiguous axial images were obtained of the brain from the skull base through the vertex. Multiplanar reformats were obtained. FINDINGS:    Prominence of the sulci and ventricles compatible with mild generalized parenchymal volume loss. Gray-white matter differentiation is preserved. Areas of bilateral supratentorial white matter hypoattenuation are nonspecific but most likely related to  chronic small vessel ischemic changes in a patient of this age. No acute hemorrhage or abnormal extra-axial fluid collection. No mass effect or midline shift. The visualized paranasal sinuses and mastoid air cells are clear. Calvarium is intact. Impression  No acute intracranial process. Generalized parenchymal volume loss and nonspecific white matter findings most compatible with chronic small vessel ischemic changes in a patient of this age.       All CT scans at this facility use dose modulation, iterative reconstruction, and/or weight based dosing when appropriate to reduce radiation dose to as low as reasonably achievable. No results found for this or any previous visit. No results found for this or any previous visit. Carotid duplex: No results found for this or any previous visit. No results found for this or any previous visit. Results for orders placed during the hospital encounter of 05/19/21    US CAROTID ARTERY BILATERAL    Narrative  History:  right sided weakness    Technique:  US CAROTID ARTERY BILATERAL    Comparison: No prior    Findings:  No significant plaque is seen. The common carotid arteries are tortuous as well as the internal carotid arteries. No increased velocity is seen. Normal antegrade flow is seen vertebral arteries. Doppler findings:  ARTERIAL BLOOD FLOW VELOCITY    RIGHT PS    Prox CCA 107cm/s  Mid CCA 110cm/s  Dist CCA 116cm/s  Prox %cm/s  Mid ICA 87cm/s  Dist ICA 19cm/s  Prox ECA 147cm/s  Dist VERT 75cm/s  Bulb  ICA/CCA 1.0    LEFT PS    Prox CCA 103cm/s  Mid CCA and ECAcm/s  Dist CCA 101cm/s  Prox ICA 78cm/s  Mid ICA 92cm/s  Dist ICA 118cm/s  Prox ECA 100cm/s  Prox VERT 47cm/s  Bulb  ICA/CCA 1.2    Impression  Impression: 1. No hemodynamic significant stenosis on the right   2. No hemodynamic significant stenosis seen on the left    Validated velocity measurements with angiographic measurements, velocity criteria are extrapolated from diameter data as defined by the Society of radiologist in ultrasound consensus conference radiology 2003; 229; 340-346. Echo No results found for this or any previous visit. Assessment/Plan:    Dementia of advanced degree with worsening. Patient has parkinsonian features and this may turn out to be a Lewy body dementia. She is not on any PD meds  or any other medication related to dementia. I am not quite sure where her work-up has been in which she is followed but she has been seen at the clinic with this findings which are not different from what I am seeing during this examination except worse.   This is likely a degenerative process which is likely to continue. Patient may not be able to swallow at some point in time feeding tube or not a feeding tube needs to be discussed. MRI was reviewed and  there is no new stroke she has extensive atrophy and he also have high-grade stenosis of the cervical spine given that she is hyperreflexic underlying spine MRI will be obtained I am not quite sure what can be done at this stage. 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 This does not require intervention. Patient has room to be treated for parkinsonism though we will wait till her evaluations are complete and if she is able to swallow we will consider carbidopa levodopa trial at least.  And to find her daughter which I was not able to do today. Patient being seen for advanced dementia with parkinsonian features. She also has hyperreflexia with concern for cervical spine pathology. Consideration being given to treatment with carbidopa levodopa. Patient does have baseline hypotension so this will have to be approached carefully should we initiate treatment with this. With erythema and significant tenderness over right medial wrist.  Will obtain x-ray, uric acid, sed rate and CRP. She also has pain and tenderness and slight swelling over the right ankle. Will obtain x-ray. Folate deficiency noted. Folic acid started. .  She has considerable weakness and inability to walk. 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 Patient's MRI is reviewed and patient actually has marked spinal cord stenosis explaining patient's walking inabilities in addition to what ever is mentioned above. Neurosurgery was consulted yesterday I do not see any input as yet and will wait for the consultation      Medardo Teague MD, Estefani Lofton, American Board of Psychiatry & Neurology  Board Certified in Vascular Neurology  Board Certified in Neuromuscular Medicine  Certified in Neurorehabilitation       Collaborating physicians: Dr Meredith Renteria    Electronically signed by Edmund Malin MD on 5/22/2021 at 12:36 PM

## 2021-05-22 NOTE — PROGRESS NOTES
Physical Therapy Med Surg Daily Treatment Note  Facility/Department: [facility]  Room: E8/J975-56       NAME: Elyse Dexter  : 1935 (80 y.o.)  MRN: [id number]  CODE STATUS: DNR-CCA    Date of Service: 2021    Patient Diagnosis(es): Right sided weakness [R53.1]  Change in mental status [R41.82]   Chief Complaint   Patient presents with    Altered Mental Status     daughter called EMS for increased AMS and weakness that started this AM when she woke up      Patient Active Problem List    Diagnosis Date Noted    Change in mental status 2021    [facility]y body dementia without behavioral disturbance [facility]     PD (Parkinson's disease) ([facility]     Ataxic gait     Dysarthria     Myelopathy ([facility]     Late onset Alzheimer's disease without behavioral disturbance ([facility]     Right sided weakness 2021        Past Medical History:   Diagnosis Date    Alzheimer disease ([facility] [facility]     Thyroid disease      History reviewed. No pertinent surgical history. Restrictions  Restrictions/Precautions: Fall Risk (High per clinical judgement)    SUBJECTIVE   General  Chart Reviewed: Yes  Family / Caregiver Present: Yes  Subjective  Subjective: pt agreeable to tx, garbled speech. Pre-Session Pain Report  Pre Treatment Pain Screening  Pain at present: 4  Scale Used: Numeric Score  Intervention List: Patient able to continue with treatment  Pain Screening  Patient Currently in Pain: Yes       Post-Session Pain Report  Pain Assessment  Pain Assessment: 0-10  Pain Level: 4  Pain Type: Acute pain  Pain Location: Ankle  Pain Orientation: Right         OBJECTIVE        Bed mobility  Supine to Sit: Maximum assistance  Sit to Supine: Maximum assistance  Comment: step by step cues needed, poor ability to complete. Transfers  Sit to Stand: Maximum Assistance  Stand to sit: Maximum Assistance  Comment: face to face transfer pt with poor effort, STS attempted x3 with only once pt completing stand.

## 2021-05-22 NOTE — CARE COORDINATION
PLAN FOR [facility] OF THE [facility] STARTED 2/74 PER DISCUSSION WITH JANICE DICKERSON. IMM REVIEWED WITH PATIENT DTRQuique WILLAMS. SIGNATURE OBTAINED. DENIES QUESTIONS.  WILL CONTINUE TO FOLLOW

## 2021-05-22 NOTE — PROGRESS NOTES
Hospitalist Daily Progress Note  Name: Get Oropeza  Age: 80 y.o. Gender: female  CodeStatus: DNR-CCA  Allergies: No Known Allergies    Chief Complaint:Altered Mental Status (daughter called EMS for increased AMS and weakness that started this AM when she woke up )      Primary Care Provider: No primary care provider on file. InpatientTreatment Team: Treatment Team: Attending Provider: Enriqueta Ardon MD; Consulting Physician: Eva Koch MD; Consulting Physician: Jai Argueta MD; Patient Care Tech: Hume Grass; Registered Nurse: Evelyn Hawkins RN; Utilization Reviewer: Malu Vidal RN    Admission Date: 5/19/2021      Subjective: No chest pain, sob, nausea. Resting in bed. Physical Exam  Vitals and nursing note reviewed. Constitutional:       Appearance: Normal appearance. Cardiovascular:      Rate and Rhythm: Normal rate and regular rhythm. Pulmonary:      Effort: Pulmonary effort is normal.      Breath sounds: Normal breath sounds. Abdominal:      General: Bowel sounds are normal.      Palpations: Abdomen is soft. Musculoskeletal:         General: Normal range of motion. Skin:     General: Skin is warm and dry. Neurological:      Mental Status: She is alert. Comments: Difficult to understand speech, no droop.   Diminished used on RUE, RLE, strength 5/5         Medications:  Reviewed    Infusion Medications:    sodium chloride       Scheduled Medications:    folic acid  1 mg Oral Daily    levothyroxine  88 mcg Oral Daily    sodium chloride flush  5-40 mL Intravenous 2 times per day    enoxaparin  40 mg Subcutaneous Daily    aspirin  81 mg Oral Daily    Or    aspirin  300 mg Rectal Daily    atorvastatin  80 mg Oral Nightly     PRN Meds: acetaminophen, sodium chloride flush, sodium chloride, promethazine **OR** ondansetron, polyethylene glycol    Labs:   Recent Labs     05/20/21  0511   WBC 7.0   HGB 12.0   HCT 35.7*        No results for input(s): NA, K, CL, CO2, BUN, CREATININE, CALCIUM, PHOS in the last 72 hours. Invalid input(s): MAGNES  No results for input(s): AST, ALT, BILIDIR, BILITOT, ALKPHOS in the last 72 hours. No results for input(s): INR in the last 72 hours. No results for input(s): Chris Cobble in the last 72 hours. Urinalysis:   Lab Results   Component Value Date    NITRU Negative 05/19/2021    WBCUA 0-2 05/19/2021    BACTERIA Negative 05/19/2021    RBCUA 0-2 05/19/2021    BLOODU TRACE 05/19/2021    SPECGRAV 1.021 05/19/2021    GLUCOSEU Negative 05/19/2021       Radiology:   Most recent    Chest CT      WITH CONTRAST:No results found for this or any previous visit. WITHOUT CONTRAST: No results found for this or any previous visit. CXR      2-view: No results found for this or any previous visit. Portable: No results found for this or any previous visit. Echo No results found for this or any previous visit. Assessment/Plan:    Active Hospital Problems    Diagnosis Date Noted    Change in mental status [R41.82] 05/21/2021    Lewy body dementia without behavioral disturbance ([address] [G31.83, F02.80]     PD (Parkinson's disease) ([address] [G20]     Ataxic gait [R26.0]     Dysarthria [R47.1]     Myelopathy ([address] [G95.9]     Late onset Alzheimer's disease without behavioral disturbance ([address] [G30.1, F02.80]     Right sided weakness [R53.1] 05/19/2021     Suspected CVA:  CVA pathway, ECHO, Bilat Carotid arterial duplex, Telemetry, ASA, Statin, Neuro on board. PT OT to evaluate for deficits and to assess and make recommendations. Monitor BP closely. Neuro checks Q4hrs. Fall precautions. Bedside swallow eval prior to starting PO diet. 5/20 - imaging unremarkable for acute cva. Neurology evaluating.   5/21 - workup for alternative options, dementia with Parkinson's features.     Gait Instability: Fall precautions, Up with assist. PT OT. Maximize nutritional status.   Consult to social work/case management for [employer] placement.     Alzheimer's without behavioral disorder: reorient PRN.  Avoiding BEERS Criteria meds     Hypothyroidism: On home dose Synthroid.        Electronically signed by Florentin Cornejo MD on 5/22/2021 at 4:47 PM

## 2021-05-22 NOTE — PROGRESS NOTES
Patient: Arnoldo Mahajan  Unit/Bed: H963/Y365-76  YOB: 1935  MRN: [id number] Acct: [de-identified]   Admitting Diagnosis: Right sided weakness [R53.1]  Change in mental status [R41.82]  Admit Date:  5/19/2021  Hospital Day: 1    Current Medications:    Scheduled Meds:   folic acid  1 mg Oral Daily    levothyroxine  88 mcg Oral Daily    sodium chloride flush  5-40 mL Intravenous 2 times per day    enoxaparin  40 mg Subcutaneous Daily    aspirin  81 mg Oral Daily    Or    aspirin  300 mg Rectal Daily    atorvastatin  80 mg Oral Nightly     Continuous Infusions:   sodium chloride       PRN Meds:.acetaminophen, sodium chloride flush, sodium chloride, promethazine **OR** ondansetron, polyethylene glycol  .  sodium chloride          Recent Labs     05/20/21  0511   WBC 7.0   HGB 12.0   HCT 35.7*   MCV 87.3        No results for input(s): NA, K, CL, CO2, PHOS, BUN, CREATININE in the last 72 hours. Invalid input(s): CA  No results for input(s): AST, ALT, ALB, BILIDIR, BILITOT, ALKPHOS in the last 72 hours. No results for input(s): LIPASE, AMYLASE in the last 72 hours. No results for input(s): PROT, INR in the last 72 hours.     Imaging Results:    Echocardiogram complete 2D with doppler with color    Result Date: 5/20/2021  Transthoracic Echocardiography Report (TTE)  Demographics   Patient Name    Navneet Flaherty Gender               Female   Patient Number  [id number]       Race                                                   Ethnicity   Visit Number    [id number]      Room Number          R722   Corporate ID                   Date of Study        05/20/2021   Accession       [id number]     Referring Physician  Number   Date of Birth   1935     Sonographer          Birdie Salmeron   Age             80 year(s)     Interpreting         [facility])                                 Physician            Cardiology                                                      Mio Pearson MD Procedure Type of Study   TTE procedure:ECHO COMPLETE 2D W/DOP W/COLOR. Procedure Date Date: 05/20/2021 Start: 10:32 AM Study Location: Portable Technical Quality: Adequate visualization Indications:LVF. Patient Status: Routine Height: 63 inches Weight: 134 pounds BSA: 1.63 m^2 BMI: 23.74 kg/m^2 BP: 122/80 mmHg  Conclusions   Summary  Normal tricuspid valve structure and function. 1+ TR  RVSP 48 mmHg  Normal left ventricle structure and function. Left ventricular ejection fraction is visually estimated at 55%. Pseudonormal filling pattern noted. Signature   ----------------------------------------------------------------  Electronically signed by Charlie Molina MD(Interpreting  physician) on 05/20/2021 03:26 PM  ----------------------------------------------------------------   Findings  Left Ventricle Normal left ventricle structure and function. Left ventricular ejection fraction is visually estimated at 55%. Pseudonormal filling pattern noted. Right Ventricle Normal right ventricle structure and function. Normal right ventricle systolic pressure. Left Atrium Normal left atrium. Right Atrium Normal right atrium. Mitral Valve Mitral annular calcification is present. Tricuspid Valve Normal tricuspid valve structure and function. 1+ TR RVSP 48 mmHg Aortic Valve Aortic valve leaflets are mildly thickened. Pulmonic Valve The pulmonic valve was not well visualized . Pericardial Effusion small Pericardial Effusion Pleural Effusion No evidence of pleural effusion. Aorta \ Miscellaneous The aorta is within normal limits. M-Mode Measurements (cm)   LVIDd: 3.16 cm                         LVIDs: 1.93 cm  IVSd: 1.18 cm                          IVSs: 1.17 cm  LVPWd: 0.92 cm                         LVPWs: 1.06 cm  Rt. Vent.  Dimension: 1.64 cm           AO Root Dimension: 2.95 cm                                         ACS: 1.55 cm                                         LA: 2.47 cm LVOT: 2.01 cm  Doppler Measurements:   AV Velocity:0.03 m/s                    MV Peak E-Wave: 1.12 m/s  AV Peak Gradient: 7.58 mmHg             MV Peak A-Wave: 0.83 m/s  AV Mean Gradient: 4.04 mmHg  AV Area (Continuity):2.92 cm^2  TR Velocity:3.06 m/s                    Estimated RAP:10 mmHg  TR Gradient:37.46 mmHg                  RVSP:47.46 mmHg  Valves  Mitral Valve   Peak E-Wave: 1.12 m/s                  Peak A-Wave: 0.83 m/s                                         E/A Ratio: 1.34                                         Peak Gradient: 4.98 mmHg                                         Deceleration Time: 187 msec   Tissue Doppler   E' Septal Velocity: 0.07 m/s  E' Lateral Velocity: 0.06 m/s   Aortic Valve   Peak Velocity: 1.38 m/s                Mean Velocity: 0.94 m/s  Peak Gradient: 7.58 mmHg               Mean Gradient: 4.04 mmHg  Area (continuity): 2.92 cm^2           Area (2D): 2.9 cm^2  AV VTI: 25.76 cm   Cusp Separation: 1.55 cm   Tricuspid Valve   Estimated RVSP: 47.46 mmHg              Estimated RAP: 10 mmHg  TR Velocity: 3.06 m/s                   TR Gradient: 37.46 mmHg   Pulmonic Valve   Peak Velocity: 1.16 m/s           Peak Gradient: 5.41 mmHg                                    Estimated PASP: 47.46 mmHg   LVOT   Peak Velocity: 1.31 m/s              Mean Velocity: 0.9 m/s  Peak Gradient: 6.82 mmHg             Mean Gradient: 3.71 mmHg  LVOT Diameter: 2.01 cm               LVOT VTI: 23.75 cm  Structures  Left Atrium   LA Dimension: 2.47 cm                        LA Area: 13.62 cm^2  LA/Aorta: 0.84  LA Volume/Index: 24.99 ml /15 m^2   Left Ventricle   Diastolic Dimension: 1.66 cm          Systolic Dimension: 2.82 cm  Septum Diastolic: 9.34 cm             Septum Systolic: 0.73 cm  PW Diastolic: 6.06 cm                 PW Systolic: 5.70 cm                                        FS: 38.9 %  LV EDV/LV EDV Index: 39.66 ml/24 m^2  LV ESV/LV ESV Index: 11.59 ml/7 m^2  EF Calculated: 70.8 %                 LV Length: 6.2 cm   LVOT Diameter: 2.01 cm   Right Atrium   RA Systolic Pressure: 10 mmHg   Right Ventricle   Diastolic Dimension: 9.50 cm                                    RV Systolic Pressure: 62.68 mmHg  Aorta/ Miscellaneous Aorta   Aortic Root: 2.95 cm  LVOT Diameter: 2.01 cm      XR RADIUS ULNA RIGHT (2 VIEWS)    Result Date: 5/19/2021  EXAMINATION: XR RADIUS ULNA RIGHT (2 VIEWS) CLINICAL HISTORY: Forearm pain COMPARISON: None available. TECHNIQUE: AP and lateral views of the radius and ulna. FINDINGS: Decreased bone mineralization. No acute fracture. Degenerative changes of the wrist. Soft tissues appear within normal limits. No acute osseous abnormality. XR WRIST RIGHT (MIN 3 VIEWS)    Result Date: 5/21/2021  EXAMINATION: XR WRIST RIGHT (MIN 3 VIEWS), 5/21/2021 11:13 AM CLINICAL HISTORY:  pain, edema COMPARISON: None TECHNIQUE: FINDINGS:  The visualized bones are demineralized which may be secondary to osteoporosis/osteopenia. There is no acute  fracture or subluxation. There is severe degenerative changes of the wrist with joint space narrowing especially involving the first  carpal metacarpal joint. There are also intra-articular calcifications and there are cysts in the scaphoid. The soft tissues are within normal limits. There are no radiopaque foreign bodies. There are no acute osseous injuries. 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 XR ANKLE RIGHT (MIN 3 VIEWS)    Result Date: 5/21/2021  XR ANKLE RIGHT (MIN 3 VIEWS) : 5/21/2021 CLINICAL HISTORY:  pain . COMPARISON: None available. TECHNIQUE: AP, lateral and oblique radiographs of the right ankle were obtained. FINDINGS: Moderate chronic healed fracture deformity of the distal right tibial diaphysis with 3 orthopedic screws is otherwise unremarkable. There is no other fracture, worrisome bone destruction, ankle mortise widening, or acute findings identified.  Degenerative changes this age. Prominence of the sulci and ventricles compatible with moderate generalized parenchymal volume loss. No edema, hemorrhage, mass, mass effect, midline shift, or abnormal extra-axial fluid collection. Midline structures are within normal limits. The posterior fossa is within normal limits. There is no diffusion restriction. No susceptibility artifact is identified on the gradient echo sequence. Cranial nerves 7/8 complexes appear grossly unremarkable. The visualized paranasal sinuses and bilateral mastoid air cells are clear. Degenerative changes of the cervical spine at C3-4 appears to result in in high-grade spinal canal stenosis but is incompletely evaluated on this examination. MRA brain: The bilateral distal cervical internal carotid arteries through the skull base are patent. The bilateral middle cerebral arteries through the trifurcation and opercular branches are patent. The vertebrobasilar system including the superior cerebellar and posterior cerebral arteries are patent. Neither posterior communicating artery is identified. The bilateral anterior cerebral arteries and anterior communicating artery are patent. No high-grade stenosis of the visualized cerebral vasculature. 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 MRI brain:  No acute ischemia or acute intracranial process. Generalized parenchymal volume loss and nonspecific white matter findings most compatible with chronic small vessel ischemic changes in a patient of this age. Degenerative changes of the cervical spine at C3-4 appears to result in in high-grade spinal canal stenosis but is incompletely evaluated on this examination. This would be better evaluated with MRI of the cervical spine. MRA brain: 5 mm saccular aneurysm arises from the medial aspect of the proximal ophthalmic division of the left internal carotid artery.  No high-grade stenosis of the visualized cerebral vasculature. MRI CERVICAL SPINE WO CONTRAST    Result Date: 5/21/2021  MRI CERVICAL SPINE WO CONTRAST : 5/21/2021 CLINICAL HISTORY: Right-sided weakness. Evaluate for cord compression . COMPARISON: None available. TECHNIQUE: Multiplanar MR imaging of the cervical spine was performed FINDINGS: Motion artifact mildly degrades the study. The spine is visualized from the craniovertebral junction through the T3-4 level on the sagittal sequences. The visualized spinal cord is normal and signal and caliber. There is no significant subluxation, fracture or acute findings identified. At the C2-3 level, there is mild diffuse disc bulging, mild posterior lateral endplate osteophytosis, and mild to moderate hypertrophic facet changes, without significant central spinal stenosis or neural foraminal narrowing. At the C3-4 level, there is moderate diffuse disc bulging, moderate hypertrophic facet and ligamentum flavum changes, which results in moderate to marked central spinal stenosis. At the C4-5 level, there is moderate to marked disc space narrowing with near complete degenerative appearing bony fusion, mild posterior lateral endplate osteophytosis and hypertrophic facet changes, without central spinal stenosis, or neural foraminal narrowing. From the C5-C6 through upper thoracic levels. There is mild to moderate diffuse disc bulging, borderline to mild disc space narrowing, mild to moderate posterolateral endplate osteophytosis and hypertrophic facet changes, without significant central spinal stenosis, neural foraminal narrowing, or discrete disc herniations. MODERATE CERVICAL SPONDYLOSIS, AS DESCRIBED IN DETAIL. MODERATE TO MARKED CENTRAL SPINAL STENOSIS C3-4. NO OTHER CENTRAL SPINAL STENOSIS, NEURAL FORAMINAL NARROWING, SUBLUXATION, OR SIGNIFICANT DISC HERNIATIONS.      MRI brain without contrast    Result Date: 5/19/2021  EXAM: MRI of the brain without contrast MRA of the brain without contrast History: Right-sided weakness Technique: Multiplanar multisequence MRI of the brain was performed without contrast. Axial 3-D time-of-flight images of the brain were obtained without contrast. 3-D volume rendered images were performed for evaluation of the cerebral vasculature. Comparison: CT brain May 19, 2021 Findings: MRI brain: Nonspecific white matter signal abnormality within the supratentorial white matter and kelsey is nonspecific but most compatible with chronic small vessel ischemic changes in a patient of this age. Prominence of the sulci and ventricles compatible with moderate generalized parenchymal volume loss. No edema, hemorrhage, mass, mass effect, midline shift, or abnormal extra-axial fluid collection. Midline structures are within normal limits. The posterior fossa is within normal limits. There is no diffusion restriction. No susceptibility artifact is identified on the gradient echo sequence. Cranial nerves 7/8 complexes appear grossly unremarkable. The visualized paranasal sinuses and bilateral mastoid air cells are clear. Degenerative changes of the cervical spine at C3-4 appears to result in in high-grade spinal canal stenosis but is incompletely evaluated on this examination. MRA brain: The bilateral distal cervical internal carotid arteries through the skull base are patent. The bilateral middle cerebral arteries through the trifurcation and opercular branches are patent. The vertebrobasilar system including the superior cerebellar and posterior cerebral arteries are patent. Neither posterior communicating artery is identified. The bilateral anterior cerebral arteries and anterior communicating artery are patent. No high-grade stenosis of the visualized cerebral vasculature. 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 MRI brain:  No acute ischemia or acute intracranial process.  Generalized parenchymal volume loss and nonspecific white matter findings most compatible with chronic small vessel ischemic changes in a patient of this age. Degenerative changes of the cervical spine at C3-4 appears to result in in high-grade spinal canal stenosis but is incompletely evaluated on this examination. This would be better evaluated with MRI of the cervical spine. MRA brain: 5 mm saccular aneurysm arises from the medial aspect of the proximal ophthalmic division of the left internal carotid artery. No high-grade stenosis of the visualized cerebral vasculature. US CAROTID ARTERY BILATERAL    Result Date: 5/19/2021  History:  right sided weakness Technique:  US CAROTID ARTERY BILATERAL Comparison: No prior Findings: No significant plaque is seen. The common carotid arteries are tortuous as well as the internal carotid arteries. No increased velocity is seen. Normal antegrade flow is seen vertebral arteries. Doppler findings: ARTERIAL BLOOD FLOW VELOCITY RIGHT PS                                               Prox CCA 107cm/s             Mid CCA 110cm/s              Dist CCA 116cm/s              Prox %cm/s              Mid ICA 87cm/s              Dist ICA 19cm/s              Prox ECA 147cm/s              Dist VERT 75cm/s              Bulb ICA/CCA 1.0 LEFT PS Prox CCA 103cm/s              Mid CCA and ECAcm/s              Dist CCA 101cm/s              Prox ICA 78cm/s              Mid ICA 92cm/s              Dist ICA 118cm/s              Prox ECA 100cm/s              Prox VERT 47cm/s              Bulb ICA/CCA 1.2     Impression: 1. No hemodynamic significant stenosis on the right   2. No hemodynamic significant stenosis seen on the left Validated velocity measurements with angiographic measurements, velocity criteria are extrapolated from diameter data as defined by the Society of radiologist in ultrasound consensus conference radiology 2003; 229; 340-346.        BP (!) 99/51   Pulse 86   Temp 98.8 °F (37.1 °C) (Axillary)   Resp 20   Ht 5' 3\" (1.6 m)   Wt 142 lb 6.7 oz (64.6 kg)   LMP  (LMP Unknown)   SpO2 93%   BMI 25.23 kg/m²     More alert today more attentive slow in speech but somewhat appropriate. Accompanied by her daughter. Awake alert attentive slow in speech appropriate. Follows simple commands with upper extremities and lower extremities. GCS of 4+4+6 total of 14. Impression :    significant progressive dementia. #2 cervical spine stenosis. Discussed with the patient's daughter with the patient front in detail of findings. In light of overall progressive declining of her mental status as well as her age and medical condition, I do not recommend any aggressive surgical intervention for the concern of high perioperative risk as well as likelihood of a worsening dementia through general anesthesia. Patient's daughter understands the seizure clearly and does not wish for any aggressive surgical intervention. Patient's daughter also understands possibility of a permanent nursing home placement for the patient as well. We will recommend social service for discharge disposition in the meantime. Once again thank you very much versus patient care.   We will see her as needed basis at this point

## 2021-05-22 NOTE — PROGRESS NOTES
Spiritual Care Services     Summary of Visit:  Pt calm and at ease with her dtr Celestino present, some anxiety with her care team, especially as we approach and put on gloves. Reassurance welcomed. Pt received communion with her dtr. Pt was a Eucharistic  for many years her dtr said. Spiritual Assessment/Intervention/Outcomes:    Encounter Summary  Services provided to[de-identified] Patient and family together  Referral/Consult From[de-identified] Rounding  Support System: Children  Continue Visiting: Yes  Complexity of Encounter: Moderate  Length of Encounter: 15 minutes  Routine  Type: Follow up  Assessment: Sleeping  Intervention: Sustaining presence/ Ministry of presence     Spiritual/Hoahaoism  Type: Spiritual struggle  Assessment: Fearful, Anxious  Intervention: Communion, Prayer, Sustaining presence/ Ministry of presence  Outcome: Expressed gratitude  Sacraments  Communion: Patient received communion, Family received communion     Advance Directives (For Healthcare)  Healthcare Directive: Yes, patient has an advance directive for healthcare treatment  Type of Healthcare Directive: Durable power of  for health care, Health care treatment directive, Living will           Values / Beliefs  Do you have any ethnic, cultural, sacramental, or spiritual Methodist needs you would like us to be aware of while you are in the hospital?: No    Care Plan:    Continued prayer, spiritual support welcomed. Spiritual Care Services   Electronically signed by Mary Lou Wynne on 5/22/21 at 4:02 PM EDT     To reach a  for emotional and spiritual support, place an [facility]'S \[facility] [place] [facility]\"" consult request.   If a  is needed immediately, dial 0 and ask to page the on-call .

## 2021-05-22 NOTE — PROGRESS NOTES
Hospitalist Daily Progress Note  Name: Jahaira Lee  Age: 80 y.o. Gender: female  CodeStatus: DNR-CCA  Allergies: No Known Allergies    Chief Complaint:Altered Mental Status (daughter called EMS for increased AMS and weakness that started this AM when she woke up )      Primary Care Provider: No primary care provider on file. InpatientTreatment Team: Treatment Team: Attending Provider: Eloina Vizcarra MD; Consulting Physician: Kristin Resendez MD; Consulting Physician: Brynn Yeboah MD; Patient Care Tech: Bashir Mancia; Registered Nurse: Zainab Kulkarni RN; Utilization Reviewer: Joey Muller RN    Admission Date: 5/19/2021      Subjective: No chest pain, sob, nausea. Resting in bed. Physical Exam  Vitals and nursing note reviewed. Constitutional:       Appearance: Normal appearance. Cardiovascular:      Rate and Rhythm: Normal rate and regular rhythm. Pulmonary:      Effort: Pulmonary effort is normal.      Breath sounds: Normal breath sounds. Abdominal:      General: Bowel sounds are normal.      Palpations: Abdomen is soft. Musculoskeletal:         General: Normal range of motion. Skin:     General: Skin is warm and dry. Neurological:      Mental Status: She is alert. Comments: Difficult to understand speech, no droop.   Diminished used on RUE, RLE, strength 5/5         Medications:  Reviewed    Infusion Medications:    sodium chloride       Scheduled Medications:    folic acid  1 mg Oral Daily    levothyroxine  88 mcg Oral Daily    sodium chloride flush  5-40 mL Intravenous 2 times per day    enoxaparin  40 mg Subcutaneous Daily    aspirin  81 mg Oral Daily    Or    aspirin  300 mg Rectal Daily    atorvastatin  80 mg Oral Nightly     PRN Meds: acetaminophen, sodium chloride flush, sodium chloride, promethazine **OR** ondansetron, polyethylene glycol    Labs:   Recent Labs     05/20/21  0511   WBC 7.0   HGB 12.0   HCT 35.7*        No results for input(s): NA, K, CL, CO2, status. Consult to social work/case management for LTAC placement.     Alzheimer's without behavioral disorder: reorient PRN.  Avoiding BEERS Criteria meds     Hypothyroidism: On home dose Synthroid.        Electronically signed by Elaine Newman MD on 5/22/2021 at 4:49 PM

## 2021-05-22 NOTE — PROGRESS NOTES
Hospitalist Daily Progress Note  Name: Esther Montelongo  Age: 80 y.o. Gender: female  CodeStatus: DNR-CCA  Allergies: No Known Allergies    Chief Complaint:Altered Mental Status (daughter called EMS for increased AMS and weakness that started this AM when she woke up )      Primary Care Provider: No primary care provider on file. InpatientTreatment Team: Treatment Team: Attending Provider: David Cottrell MD; Consulting Physician: Reena Jacobo MD; Consulting Physician: Valarie Ayon MD; Patient Care Tech: Choco Layton; Registered Nurse: Akhil Beach RN; Utilization Reviewer: Prabha Suggs RN    Admission Date: 5/19/2021      Subjective: No chest pain, sob, nausea. Resting in bed. Physical Exam  Vitals and nursing note reviewed. Constitutional:       Appearance: Normal appearance. Cardiovascular:      Rate and Rhythm: Normal rate and regular rhythm. Pulmonary:      Effort: Pulmonary effort is normal.      Breath sounds: Normal breath sounds. Abdominal:      General: Bowel sounds are normal.      Palpations: Abdomen is soft. Musculoskeletal:         General: Normal range of motion. Skin:     General: Skin is warm and dry. Neurological:      Mental Status: She is alert. Comments: Difficult to understand speech, no droop.   Diminished used on RUE, RLE, strength 5/5         Medications:  Reviewed    Infusion Medications:    sodium chloride       Scheduled Medications:    folic acid  1 mg Oral Daily    levothyroxine  88 mcg Oral Daily    sodium chloride flush  5-40 mL Intravenous 2 times per day    enoxaparin  40 mg Subcutaneous Daily    aspirin  81 mg Oral Daily    Or    aspirin  300 mg Rectal Daily    atorvastatin  80 mg Oral Nightly     PRN Meds: acetaminophen, sodium chloride flush, sodium chloride, promethazine **OR** ondansetron, polyethylene glycol    Labs:   Recent Labs     05/20/21  0511   WBC 7.0   HGB 12.0   HCT 35.7*        No results for input(s): NA, K, CL, CO2, BUN, CREATININE, CALCIUM, PHOS in the last 72 hours. Invalid input(s): MAGNES  No results for input(s): AST, ALT, BILIDIR, BILITOT, ALKPHOS in the last 72 hours. No results for input(s): INR in the last 72 hours. No results for input(s): [place] in the last 72 hours. Urinalysis:   Lab Results   Component Value Date    NITRU Negative 05/19/2021    WBCUA 0-2 05/19/2021    BACTERIA Negative 05/19/2021    RBCUA 0-2 05/19/2021    BLOODU TRACE 05/19/2021    SPECGRAV 1.021 05/19/2021    GLUCOSEU Negative 05/19/2021       Radiology:   Most recent    Chest CT      WITH CONTRAST:No results found for this or any previous visit. WITHOUT CONTRAST: No results found for this or any previous visit. CXR      2-view: No results found for this or any previous visit. Portable: No results found for this or any previous visit. Echo No results found for this or any previous visit. Assessment/Plan:    Active Hospital Problems    Diagnosis Date Noted    Change in mental status [R41.82] 05/21/2021    Lewy body dementia without behavioral disturbance ([address] [G31.83, F02.80]     PD (Parkinson's disease) ([address] [G20]     Ataxic gait [R26.0]     Dysarthria [R47.1]     Myelopathy ([address] [G95.9]     Late onset Alzheimer's disease without behavioral disturbance ([address] [G30.1, F02.80]     Right sided weakness [R53.1] 05/19/2021     Suspected CVA:  CVA pathway, ECHO, Bilat Carotid arterial duplex, Telemetry, ASA, Statin, Neuro on board. PT OT to evaluate for deficits and to assess and make recommendations. Monitor BP closely. Neuro checks Q4hrs. Fall precautions. Bedside swallow eval prior to starting PO diet. 5/20 - imaging unremarkable for acute cva. Neurology evaluating.     Gait Instability: Fall precautions, Up with assist. PT OT. Maximize nutritional status. Consult to social work/case management for LTAC placement.     Alzheimer's without behavioral disorder: reorient PRN.  Avoiding

## 2021-05-23 PROCEDURE — 96372 THER/PROPH/DIAG INJ SC/IM: CPT

## 2021-05-23 PROCEDURE — 1210000000 HC MED SURG R&B

## 2021-05-23 PROCEDURE — 99233 SBSQ HOSP IP/OBS HIGH 50: CPT | Performed by: PSYCHIATRY & NEUROLOGY

## 2021-05-23 PROCEDURE — 6370000000 HC RX 637 (ALT 250 FOR IP): Performed by: FAMILY MEDICINE

## 2021-05-23 PROCEDURE — 2580000003 HC RX 258: Performed by: FAMILY MEDICINE

## 2021-05-23 PROCEDURE — G0378 HOSPITAL OBSERVATION PER HR: HCPCS

## 2021-05-23 PROCEDURE — 6360000002 HC RX W HCPCS: Performed by: FAMILY MEDICINE

## 2021-05-23 PROCEDURE — 6370000000 HC RX 637 (ALT 250 FOR IP): Performed by: NURSE PRACTITIONER

## 2021-05-23 RX ADMIN — Medication 10 ML: at 08:51

## 2021-05-23 RX ADMIN — ACETAMINOPHEN 650 MG: 325 TABLET ORAL at 06:03

## 2021-05-23 RX ADMIN — FOLIC ACID 1 MG: 1 TABLET ORAL at 08:50

## 2021-05-23 RX ADMIN — Medication 10 ML: at 20:32

## 2021-05-23 RX ADMIN — ASPIRIN 81 MG: 81 TABLET, COATED ORAL at 08:50

## 2021-05-23 RX ADMIN — ATORVASTATIN CALCIUM 80 MG: 40 TABLET, FILM COATED ORAL at 20:32

## 2021-05-23 RX ADMIN — LEVOTHYROXINE SODIUM 88 MCG: 88 TABLET ORAL at 06:04

## 2021-05-23 RX ADMIN — ENOXAPARIN SODIUM 40 MG: 40 INJECTION SUBCUTANEOUS at 08:50

## 2021-05-23 ASSESSMENT — ENCOUNTER SYMPTOMS
WHEEZING: 0
COUGH: 0
VOMITING: 0
COLOR CHANGE: 0
TROUBLE SWALLOWING: 0

## 2021-05-23 ASSESSMENT — PAIN SCALES - GENERAL: PAINLEVEL_OUTOF10: 0

## 2021-05-23 NOTE — PROGRESS NOTES
for leg swelling. Gastrointestinal: Negative for vomiting. Musculoskeletal: Positive for arthralgias, gait problem and joint swelling. Skin: Negative for color change and rash. Neurological: Positive for speech difficulty and weakness. Negative for tremors, seizures, syncope and facial asymmetry. Psychiatric/Behavioral: Positive for confusion. Negative for agitation. The patient is not nervous/anxious. Same as noted above  Physical Exam  Vitals and nursing note reviewed. Constitutional:       General: She is not in acute distress. Appearance: She is not diaphoretic. HENT:      Head: Normocephalic and atraumatic. Eyes:      Pupils: Pupils are equal, round, and reactive to light. Cardiovascular:      Rate and Rhythm: Normal rate and regular rhythm. Pulmonary:      Effort: Pulmonary effort is normal. No respiratory distress. Breath sounds: Normal breath sounds. Abdominal:      General: Bowel sounds are normal.      Palpations: Abdomen is soft. Musculoskeletal:         General: Swelling and tenderness present. Cervical back: No rigidity or tenderness. Skin:     General: Skin is warm and dry. Neurological:      Mental Status: She is alert. She is disoriented. Cranial Nerves: Dysarthria present. No facial asymmetry. Motor: Weakness present. No tremor or seizure activity. Comments: Neuro exam limited due to patient's significant confusion and dysarthria with difficulty following commands       Patient is much more awake today was able to actually speak somewhat better and she recognize her daughter and she ate 75% of her diet as well there was no choking  As noted above see no new changes.       Medications:  Reviewed    Infusion Medications:    sodium chloride       Scheduled Medications:    folic acid  1 mg Oral Daily    levothyroxine  88 mcg Oral Daily    sodium chloride flush  5-40 mL Intravenous 2 times per day    enoxaparin  40 mg Subcutaneous Daily    collection. Midline structures are within normal limits. The  posterior fossa is within normal limits. There is no diffusion restriction. No susceptibility artifact is identified on the gradient echo sequence. Cranial nerves 7/8 complexes appear grossly unremarkable. The visualized paranasal sinuses and bilateral mastoid air cells are clear. Degenerative changes of the cervical spine at C3-4 appears to result in in high-grade spinal canal stenosis but is incompletely evaluated on this examination. MRA brain:    The bilateral distal cervical internal carotid arteries through the skull base are patent. The bilateral middle cerebral arteries through the trifurcation and opercular branches are patent. The vertebrobasilar system including the superior cerebellar and posterior cerebral arteries are patent. Neither posterior communicating artery is identified. The bilateral anterior cerebral arteries and anterior communicating artery are patent. No high-grade stenosis of the visualized cerebral vasculature. 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 Impression  MRI brain:    No acute ischemia or acute intracranial process. Generalized parenchymal volume loss and nonspecific white matter findings most compatible with chronic small vessel ischemic changes in a patient of this age. Degenerative changes of the cervical spine at C3-4 appears to result in in high-grade spinal canal stenosis but is incompletely evaluated on this examination. This would be better evaluated with MRI of the cervical spine. 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 No high-grade stenosis of the visualized cerebral vasculature.                             MRA of the Head and Neck: No results found for this or any previous visit. No results found for this or any previous visit. Results for orders placed during the hospital encounter of 05/19/21    MRA head without contrast    Narrative  EXAM:    MRI of the brain without contrast    MRA of the brain without contrast    History: Right-sided weakness    Technique: Multiplanar multisequence MRI of the brain was performed without contrast. Axial 3-D time-of-flight images of the brain were obtained without contrast. 3-D volume rendered images were performed for evaluation of the cerebral vasculature. Comparison: CT brain May 19, 2021    Findings:    MRI brain:    Nonspecific white matter signal abnormality within the supratentorial white matter and kelsey is nonspecific but most compatible with chronic small vessel ischemic changes in a patient of this age. Prominence of the sulci and ventricles compatible with moderate generalized parenchymal volume loss. No edema, hemorrhage, mass, mass effect, midline shift, or abnormal extra-axial fluid collection. Midline structures are within normal limits. The  posterior fossa is within normal limits. There is no diffusion restriction. No susceptibility artifact is identified on the gradient echo sequence. Cranial nerves 7/8 complexes appear grossly unremarkable. The visualized paranasal sinuses and bilateral mastoid air cells are clear. Degenerative changes of the cervical spine at C3-4 appears to result in in high-grade spinal canal stenosis but is incompletely evaluated on this examination. MRA brain:    The bilateral distal cervical internal carotid arteries through the skull base are patent. The bilateral middle cerebral arteries through the trifurcation and opercular branches are patent. The vertebrobasilar system including the superior cerebellar and posterior cerebral arteries are patent. Neither posterior communicating artery is identified.     The bilateral anterior cerebral arteries and anterior communicating artery are patent. No high-grade stenosis of the visualized cerebral vasculature. 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 Impression  MRI brain:    No acute ischemia or acute intracranial process. Generalized parenchymal volume loss and nonspecific white matter findings most compatible with chronic small vessel ischemic changes in a patient of this age. Degenerative changes of the cervical spine at C3-4 appears to result in in high-grade spinal canal stenosis but is incompletely evaluated on this examination. This would be better evaluated with MRI of the cervical spine. 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 No high-grade stenosis of the visualized cerebral vasculature. CT of the Head: Results for orders placed during the hospital encounter of 05/19/21    CT Head WO Contrast    Narrative  EXAMINATION: CT of the brain without contrast    HISTORY: Altered mental status    COMPARISON: None available    TECHNIQUE: Multiple contiguous axial images were obtained of the brain from the skull base through the vertex. Multiplanar reformats were obtained. FINDINGS:    Prominence of the sulci and ventricles compatible with mild generalized parenchymal volume loss. Gray-white matter differentiation is preserved. Areas of bilateral supratentorial white matter hypoattenuation are nonspecific but most likely related to  chronic small vessel ischemic changes in a patient of this age. No acute hemorrhage or abnormal extra-axial fluid collection. No mass effect or midline shift. The visualized paranasal sinuses and mastoid air cells are clear. Calvarium is intact. Impression  No acute intracranial process.     Generalized parenchymal volume loss and nonspecific white matter findings most compatible with chronic small vessel ischemic changes in a patient of this age. All CT scans at this facility use dose modulation, iterative reconstruction, and/or weight based dosing when appropriate to reduce radiation dose to as low as reasonably achievable. No results found for this or any previous visit. No results found for this or any previous visit. Carotid duplex: No results found for this or any previous visit. No results found for this or any previous visit. Results for orders placed during the hospital encounter of 05/19/21    US CAROTID ARTERY BILATERAL    Narrative  History:  right sided weakness    Technique:  US CAROTID ARTERY BILATERAL    Comparison: No prior    Findings:  No significant plaque is seen. The common carotid arteries are tortuous as well as the internal carotid arteries. No increased velocity is seen. Normal antegrade flow is seen vertebral arteries. Doppler findings:  ARTERIAL BLOOD FLOW VELOCITY    RIGHT PS    Prox CCA 107cm/s  Mid CCA 110cm/s  Dist CCA 116cm/s  Prox %cm/s  Mid ICA 87cm/s  Dist ICA 19cm/s  Prox ECA 147cm/s  Dist VERT 75cm/s  Bulb  ICA/CCA 1.0    LEFT PS    Prox CCA 103cm/s  Mid CCA and ECAcm/s  Dist CCA 101cm/s  Prox ICA 78cm/s  Mid ICA 92cm/s  Dist ICA 118cm/s  Prox ECA 100cm/s  Prox VERT 47cm/s  Bulb  ICA/CCA 1.2    Impression  Impression: 1. No hemodynamic significant stenosis on the right   2. No hemodynamic significant stenosis seen on the left    Validated velocity measurements with angiographic measurements, velocity criteria are extrapolated from diameter data as defined by the Society of radiologist in ultrasound consensus conference radiology 2003; 229; 340-346. Echo No results found for this or any previous visit. Assessment/Plan:    Dementia of advanced degree with worsening. Patient has parkinsonian features and this may turn out to be a Lewy body dementia.   She is not on any PD meds  or any other medication related to dementia. I am not quite sure where her work-up has been in which she is followed but she has been seen at the clinic with this findings which are not different from what I am seeing during this examination except worse. This is likely a degenerative process which is likely to continue. Patient may not be able to swallow at some point in time feeding tube or not a feeding tube needs to be discussed. MRI was reviewed and  there is no new stroke she has extensive atrophy and he also have high-grade stenosis of the cervical spine given that she is hyperreflexic underlying spine MRI will be obtained I am not quite sure what can be done at this stage. 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 This does not require intervention. Patient has room to be treated for parkinsonism though we will wait till her evaluations are complete and if she is able to swallow we will consider carbidopa levodopa trial at least.  And to find her daughter which I was not able to do today. Patient being seen for advanced dementia with parkinsonian features. She also has hyperreflexia with concern for cervical spine pathology. Consideration being given to treatment with carbidopa levodopa. Patient does have baseline hypotension so this will have to be approached carefully should we initiate treatment with this. With erythema and significant tenderness over right medial wrist.  Will obtain x-ray, uric acid, sed rate and CRP. She also has pain and tenderness and slight swelling over the right ankle. Will obtain x-ray. Folate deficiency noted. Folic acid started. Patient is quite awake today though not oriented but recognize the daughter. She is dysarthric and has spastic speech as well. Patient was able to though eat 75% of her diet.   She was able to actually drink water on her own which is new for her since she has been in the hospital.  Findings were

## 2021-05-23 NOTE — PROGRESS NOTES
Hospitalist Daily Progress Note  Name: Yonis Rice  Age: 80 y.o. Gender: female  CodeStatus: DNR-CCA  Allergies: No Known Allergies    Chief Complaint:Altered Mental Status (daughter called EMS for increased AMS and weakness that started this AM when she woke up )      Primary Care Provider: No primary care provider on file. InpatientTreatment Team: Treatment Team: Attending Provider: Maday Hauser MD; Consulting Physician: Enrie Ford MD; Consulting Physician: Rah Valladares MD; Registered Nurse: Eri Gandhi RN    Admission Date: 5/19/2021      Subjective: No chest pain, sob, nausea. Resting in bed. Poor speech, difficult to understand. Physical Exam  Vitals and nursing note reviewed. Constitutional:       Appearance: Normal appearance. Cardiovascular:      Rate and Rhythm: Normal rate and regular rhythm. Pulmonary:      Effort: Pulmonary effort is normal.      Breath sounds: Normal breath sounds. Abdominal:      General: Bowel sounds are normal.      Palpations: Abdomen is soft. Musculoskeletal:         General: Normal range of motion. Skin:     General: Skin is warm and dry. Neurological:      Mental Status: She is alert. Comments: Difficult to understand speech, no droop. Diminished used on RUE, RLE, strength 5/5         Medications:  Reviewed    Infusion Medications:    sodium chloride       Scheduled Medications:    folic acid  1 mg Oral Daily    levothyroxine  88 mcg Oral Daily    sodium chloride flush  5-40 mL Intravenous 2 times per day    enoxaparin  40 mg Subcutaneous Daily    aspirin  81 mg Oral Daily    Or    aspirin  300 mg Rectal Daily    atorvastatin  80 mg Oral Nightly     PRN Meds: acetaminophen, sodium chloride flush, sodium chloride, promethazine **OR** ondansetron, polyethylene glycol    Labs:   No results for input(s): WBC, HGB, HCT, PLT in the last 72 hours.   No results for input(s): NA, K, CL, CO2, BUN, CREATININE, CALCIUM, PHOS in the last 72 hours.    Invalid input(s): MAGNES  No results for input(s): AST, ALT, BILIDIR, BILITOT, ALKPHOS in the last 72 hours. No results for input(s): INR in the last 72 hours. No results for input(s): Earnie Lent in the last 72 hours. Urinalysis:   Lab Results   Component Value Date    NITRU Negative 05/19/2021    WBCUA 0-2 05/19/2021    BACTERIA Negative 05/19/2021    RBCUA 0-2 05/19/2021    BLOODU TRACE 05/19/2021    SPECGRAV 1.021 05/19/2021    GLUCOSEU Negative 05/19/2021       Radiology:   Most recent    Chest CT      WITH CONTRAST:No results found for this or any previous visit. WITHOUT CONTRAST: No results found for this or any previous visit. CXR      2-view: No results found for this or any previous visit. Portable: No results found for this or any previous visit. Echo No results found for this or any previous visit. Assessment/Plan:    Active Hospital Problems    Diagnosis Date Noted    Change in mental status [R41.82] 05/21/2021    Lewy body dementia without behavioral disturbance ([address] [G31.83, F02.80]     PD (Parkinson's disease) ([address] [G20]     Ataxic gait [R26.0]     Dysarthria [R47.1]     Myelopathy ([address] [G95.9]     Late onset Alzheimer's disease without behavioral disturbance ([address] [G30.1, F02.80]     Right sided weakness [R53.1] 05/19/2021     Suspected CVA:  CVA pathway, ECHO, Bilat Carotid arterial duplex, Telemetry, ASA, Statin, Neuro on board. PT OT to evaluate for deficits and to assess and make recommendations. Monitor BP closely. Neuro checks Q4hrs. Fall precautions. Bedside swallow eval prior to starting PO diet. 5/20 - imaging unremarkable for acute cva. Neurology evaluating.   5/21 - workup for alternative options, dementia with Parkinson's features. 5/22 - noted cervical cord compression, nsgy consulted. 5/23 - patient not a good surgical candidate, NSGY d/w family, agree for conservative mgmt.   SNF planning     Gait Instability:

## 2021-05-23 NOTE — PROGRESS NOTES
Pt assessment complete. Pt is alert. Oriented to self only. Speech is slurred. Pt took fluids and HS snack well. Follows commands. Assisted with positioning. Call light in reach. Bed alarm on. 0610:  Pt given full bath at this time. Also given tylenol for discomfort. When asked if she had mild, moderate, or sever pain, pt stated mild. Assisted with repositioning.

## 2021-05-23 NOTE — PROGRESS NOTES
1285 - shift assessment complete, pt alert and oriented to self, speech is slurred but clearer this morning than it was previously, lung sounds diminished bilaterally, heart rate regular, bowel sounds present, per charting pt last BM 5/17. Pt has redness on her sacrum, paste applied, purwic intact, pt repositioned. Pt in bed with call light in reach. 1439 - pt calm and cooperative in bed, with call light in reach. Purwic draining clear/yellow urine.

## 2021-05-24 PROCEDURE — 6370000000 HC RX 637 (ALT 250 FOR IP): Performed by: NURSE PRACTITIONER

## 2021-05-24 PROCEDURE — 2580000003 HC RX 258: Performed by: FAMILY MEDICINE

## 2021-05-24 PROCEDURE — APPSS15 APP SPLIT SHARED TIME 0-15 MINUTES: Performed by: NURSE PRACTITIONER

## 2021-05-24 PROCEDURE — 97116 GAIT TRAINING THERAPY: CPT

## 2021-05-24 PROCEDURE — 96372 THER/PROPH/DIAG INJ SC/IM: CPT

## 2021-05-24 PROCEDURE — 97535 SELF CARE MNGMENT TRAINING: CPT

## 2021-05-24 PROCEDURE — 6370000000 HC RX 637 (ALT 250 FOR IP): Performed by: FAMILY MEDICINE

## 2021-05-24 PROCEDURE — 6360000002 HC RX W HCPCS: Performed by: FAMILY MEDICINE

## 2021-05-24 PROCEDURE — G0378 HOSPITAL OBSERVATION PER HR: HCPCS

## 2021-05-24 PROCEDURE — 99232 SBSQ HOSP IP/OBS MODERATE 35: CPT | Performed by: PSYCHIATRY & NEUROLOGY

## 2021-05-24 PROCEDURE — 1210000000 HC MED SURG R&B

## 2021-05-24 RX ADMIN — ENOXAPARIN SODIUM 40 MG: 40 INJECTION SUBCUTANEOUS at 09:25

## 2021-05-24 RX ADMIN — FOLIC ACID 1 MG: 1 TABLET ORAL at 09:25

## 2021-05-24 RX ADMIN — Medication 10 ML: at 09:27

## 2021-05-24 RX ADMIN — Medication 10 ML: at 19:45

## 2021-05-24 RX ADMIN — LEVOTHYROXINE SODIUM 88 MCG: 88 TABLET ORAL at 09:27

## 2021-05-24 RX ADMIN — ASPIRIN 81 MG: 81 TABLET, COATED ORAL at 09:25

## 2021-05-24 ASSESSMENT — ENCOUNTER SYMPTOMS
VOMITING: 0
WHEEZING: 0
COUGH: 0
COLOR CHANGE: 0
TROUBLE SWALLOWING: 0

## 2021-05-24 ASSESSMENT — PAIN SCALES - GENERAL: PAINLEVEL_OUTOF10: 2

## 2021-05-24 ASSESSMENT — PAIN DESCRIPTION - LOCATION: LOCATION: HAND;WRIST

## 2021-05-24 NOTE — PROGRESS NOTES
Physical Therapy Med Surg Daily Treatment Note  Facility/Department: [facility]  [facility]       NAME: Yesenia Ibarra  : 1935 (80 y.o.)  MRN: [id number]  CODE STATUS: DNR-CCA    Date of Service: 2021    Patient Diagnosis(es): Right sided weakness [R53.1]  Change in mental status [R41.82]   Chief Complaint   Patient presents with    Altered Mental Status     daughter called EMS for increased AMS and weakness that started this AM when she woke up      Patient Active Problem List    Diagnosis Date Noted    Change in mental status 2021    [facility]y body dementia without [facility] disturbance [facility] [address]     PD (Parkinson's disease) ([facility] [address] [facility]     Ataxic gait     Dysarthria     Myelopathy ([facility] [address] [facility]     Late onset Alzheimer's disease without behavioral disturbance ([address]     Right sided weakness 2021        Past Medical History:   Diagnosis Date    Alzheimer disease ([facility] [address]     Thyroid disease      History reviewed. No pertinent surgical history. Restrictions  Restrictions/Precautions: Fall Risk    SUBJECTIVE   General  Chart Reviewed: Yes  Family / Caregiver Present: Yes (dtg)  Subjective  Subjective: able to state name and willing to participate  General Comment  Comments: pt slow to process but able to respond when given time    Pre-Session Pain Report     Pain Screening  Patient Currently in Pain: Yes  Pre Treatment Pain Screening  Pain at present: 3  Scale Used: Numeric Score  Intervention List: Patient able to continue with treatment    Post-Session Pain Report  Pain Assessment  Pain Assessment: 0-10  Pain Level: 2  Pain Location: Hand;Wrist  Pain Orientation: Right         OBJECTIVE        Bed mobility  Rolling to Left: Minimal assistance  Rolling to Right: Minimal assistance  Supine to Sit: Maximum assistance  Sit to Supine:  (DNT pt in chair)  Scooting: Maximal assistance  Comment: step by step cues needed, increased time and effort.  improved ability to complete    Transfers  Sit to Stand: Minimal Assistance (retro leaning)  Stand to sit: Minimal Assistance  Bed to Chair: Minimal assistance  Stand Pivot Transfers: Minimal Assistance (pt less fearful with face to face transfer then with [employer].)  Comment: 2x STS with [employer] and once face to face. pt demonstrated min with either transfer but is less fearful with face to face transfer. VC for hand placement. FF posture,downward gaze, no buckling noted, fatigued and fearful    Ambulation  Ambulation?: Yes  More Ambulation?: No  Ambulation 1  Surface: level tile  Device: Rolling Walker  Assistance: 2 Person assistance;Maximum assistance  Quality of Gait: FF posture, downward gaze, heavy retro LOB/leaning, slow to process tasks asked, short BLE step length, decreased BLE foot clearance, fatigued, fearful. Distance: 4 steps forwards 4 backwards  Comments: pts dtg reports pt walks hand in hand at home. therefore trial gait HHA next tx vs AD. Activity Tolerance  Activity Tolerance: Patient limited by fatigue          ASSESSMENT     Pt demonstrated slightly improved functional mobility this session as she was able to perform bed mobility and transfers with less assistance needed. Gait was initiated this session as pt was able to take four steps, but was retropulsive and needed max A and +2 for safety. Discharge Recommendations:  Continue to assess pending progress, Patient would benefit from continued therapy after discharge    Goals  Long term goals  Long term goal 1: Bed mobility with SBA  Long term goal 2: Functional transfers with SBA  Long term goal 3: Amb household distance  Patient Goals   Patient goals : unable to state; dtr wishes for pt to be able to return home    PLAN    NV: gait HHA? Times per week: 3-6  Safety Devices  Type of devices:  All fall risk precautions in place, Call light within reach, Chair alarm in place, Nurse notified, Left in chair     [facility] (6 CLICK) [id number] Mobility Raw Score : 14     Therapy Time   Individual   Time In 0855   Time Out 0920   Minutes 25         bm/transfers:10  Gait: Jesus Alberto Godinez PTA, 05/24/21 at 9:59 AM         Definitions for assistance levels  Independent = pt does not require any physical supervision or assistance from another person for activity completion. Device may be needed.   Stand by assistance = pt requires verbal cues or instructions from another person, close to but not touching, to perform the activity  Minimal assistance= pt performs 75% or more of the activity; assistance is required to complete the activity  Moderate assistance= pt performs 50% of the activity; assistance is required to complete the activity  Maximal assistance = pt performs 25% of the activity; assistance is required to complete the activity  Dependent = pt requires total physical assistance to accomplish the task

## 2021-05-24 NOTE — PROGRESS NOTES
Hospitalist Progress Note      PCP: No primary care provider on file. Date of Admission: 5/19/2021    Chief Complaint:  No acute events, afebrile, stable HD    Medications:  Reviewed    Infusion Medications    sodium chloride       Scheduled Medications    folic acid  1 mg Oral Daily    levothyroxine  88 mcg Oral Daily    sodium chloride flush  5-40 mL Intravenous 2 times per day    enoxaparin  40 mg Subcutaneous Daily    aspirin  81 mg Oral Daily    Or    aspirin  300 mg Rectal Daily    atorvastatin  80 mg Oral Nightly     PRN Meds: acetaminophen, sodium chloride flush, sodium chloride, promethazine **OR** ondansetron, polyethylene glycol      Intake/Output Summary (Last 24 hours) at 5/24/2021 1118  Last data filed at 5/24/2021 0507  Gross per 24 hour   Intake 440 ml   Output 990 ml   Net -550 ml       Exam:    /61   Pulse 89   Temp 97.7 °F (36.5 °C) (Oral)   Resp 18   Ht 5' 3\" (1.6 m)   Wt 142 lb 6.7 oz (64.6 kg)   LMP  (LMP Unknown)   SpO2 96%   BMI 25.23 kg/m²     General appearance: appears stated age and cooperative. Respiratory:  clear to auscultation, bilaterally  Cardiovascular: Regular rate and rhythm, S1/S2 . Abdomen: Soft, active bowel sounds. Musculoskeletal: No edema bilaterally. Labs:   No results for input(s): WBC, HGB, HCT, PLT in the last 72 hours. No results for input(s): NA, K, CL, CO2, BUN, CREATININE, CALCIUM, PHOS in the last 72 hours. Invalid input(s): MAGNES  No results for input(s): AST, ALT, BILIDIR, BILITOT, ALKPHOS in the last 72 hours. No results for input(s): INR in the last 72 hours. No results for input(s): Chris Cobble in the last 72 hours.     Urinalysis:      Lab Results   Component Value Date    NITRU Negative 05/19/2021    WBCUA 0-2 05/19/2021    BACTERIA Negative 05/19/2021    RBCUA 0-2 05/19/2021    BLOODU TRACE 05/19/2021    SPECGRAV 1.021 05/19/2021    GLUCOSEU Negative 05/19/2021       Radiology:  XR WRIST RIGHT (MIN 3 VIEWS) Final Result   There are no acute osseous injuries. 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 XR ANKLE RIGHT (MIN 3 VIEWS)   Final Result      NO ACUTE FRACTURE OR OTHER ACUTE FINDINGS IDENTIFIED. MRI CERVICAL SPINE WO CONTRAST   Final Result      MODERATE CERVICAL SPONDYLOSIS, AS DESCRIBED IN DETAIL. MODERATE TO MARKED CENTRAL SPINAL STENOSIS C3-4. NO OTHER CENTRAL SPINAL STENOSIS, NEURAL FORAMINAL NARROWING, SUBLUXATION, OR SIGNIFICANT DISC HERNIATIONS. MRI brain without contrast   Final Result      MRI brain:       No acute ischemia or acute intracranial process. Generalized parenchymal volume loss and nonspecific white matter findings most compatible with chronic small vessel ischemic changes in a patient of this age. Degenerative changes of the cervical spine at C3-4 appears to result in in high-grade spinal canal stenosis but is incompletely evaluated on this examination. This would be better evaluated with MRI of the cervical spine. 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 No high-grade stenosis of the visualized cerebral vasculature. MRA head without contrast   Final Result      MRI brain:       No acute ischemia or acute intracranial process. Generalized parenchymal volume loss and nonspecific white matter findings most compatible with chronic small vessel ischemic changes in a patient of this age. Degenerative changes of the cervical spine at C3-4 appears to result in in high-grade spinal canal stenosis but is incompletely evaluated on this examination. This would be better evaluated with MRI of the cervical spine. 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

## 2021-05-24 NOTE — PROGRESS NOTES
Shift assessment completed. Pt is alert and oriented to self only. PERRLA, pt has slurred speech, weakness to left hand  and weakness to foot push/pulls. Lung sounds regular, unlabored, chest expansion symmetrical but diminished. Heart sounds WDL. Bowel sounds present x4 quadrants. Pt has no requests at this time, resting comfortably in bed, call light within reach.

## 2021-05-24 NOTE — FLOWSHEET NOTE
Spiritual Care Services     Summary of Visit:        Spiritual Assessment/Intervention/Outcomes:    Encounter Summary  Services provided to[de-identified] (P) Family  Referral/Consult From[de-identified] (P) Rounding  Support System: (P) Children  Continue Visiting: (P) No  Complexity of Encounter: (P) Moderate  Length of Encounter: (P) 30 minutes  Spiritual Assessment Completed: (P) Yes  Routine  Type: (P) Follow up  Assessment: Sleeping  Intervention: Sustaining presence/ Ministry of presence     Spiritual/Congregational  Type: (P) Spiritual struggle  Assessment: (P) Approachable, Hopeful, Coping, Tearful, Concerns with suffering, Questioning meaning/purpose, Questioning crystal  Intervention: (P) Active listening, Explored feelings, thoughts, concerns, Scripture, Sustaining presence/ Ministry of presence, Discussed relationship with God, Discussed meaning/purpose, Discussed illness/injury and it's impact  Outcome: (P) Expressed gratitude, Comfort, Engaged in conversation, Expressed feelings/needs/concerns, Tearful, Less anxious, less agitated, Receptive, Encouraged  Sacraments  Sacrament of Sick-Anointing: (P) Anointed  Communion: Patient received communion, Family received communion     Advance Directives (For Healthcare)  Healthcare Directive: Yes, patient has an advance directive for healthcare treatment  Type of Healthcare Directive: Durable power of  for health care, Health care treatment directive, Living will           Values / Beliefs  Do you have any ethnic, cultural, sacramental, or spiritual Alevism needs you would like us to be aware of while you are in the hospital?: No    Care Plan:        [id number] [address]   Electronically signed by Osmin Hinojosa on 5/24/21 at 3:38 PM EDT     To reach a  for emotional and spiritual support, place an [facility] consult request.   If a  is needed immediately, dial 0 and ask to page the on-call .

## 2021-05-24 NOTE — PROGRESS NOTES
[facility] Neurology Daily Progress Note  Name: John Paul Rowe  Age: 80 y.o. Gender: female  CodeStatus: DNR-CCA  Allergies: No Known Allergies    Chief Complaint:Altered Mental Status (daughter called EMS for increased AMS and weakness that started this AM when she woke up )    Primary Care Provider: No primary care provider on file. InpatientTreatment Team: Treatment Team: Attending Provider: Scarlet Nicholson MD; Consulting Physician: Gerry Mullins MD; : Bhaskar Schaefer RN; Registered Nurse: Namita Malik RN; Utilization Reviewer: Billie Headley RN  Admission Date: 5/19/2021         Pt seen and examined for neuro follow up for advanced dementia with parkinsonian features. Patient with speech difficulties. Daughter reports she has this at baseline but it is slightly worse. She remains confused and has some PD features   She has severe spinal stenosis nonoperable discussed with neurosurgery. Nonfocal.  No seizures. More awake and mentation better    Vitals:    05/23/21 2010   BP: 109/61   Pulse: 89   Resp: 18   Temp: 97.7 °F (36.5 °C)   SpO2:       Review of Systems   Unable to perform ROS: Dementia   Constitutional: Positive for activity change. Negative for fever. HENT: Negative for hearing loss and trouble swallowing. Respiratory: Negative for cough and wheezing. Cardiovascular: Negative for leg swelling. Gastrointestinal: Negative for vomiting. Musculoskeletal: Positive for arthralgias, gait problem and joint swelling. Skin: Negative for color change and rash. Neurological: Positive for speech difficulty and weakness. Negative for tremors, seizures, syncope and facial asymmetry. Psychiatric/Behavioral: Positive for confusion. Negative for agitation. The patient is not nervous/anxious. Physical Exam  Vitals and nursing note reviewed. Constitutional:       General: She is not in acute distress. Appearance: She is not diaphoretic.    HENT:      Head: Normocephalic and results found for this or any previous visit. Results for orders placed during the hospital encounter of 05/19/21    MRI brain without contrast    Narrative  EXAM:    MRI of the brain without contrast    MRA of the brain without contrast    History: Right-sided weakness    Technique: Multiplanar multisequence MRI of the brain was performed without contrast. Axial 3-D time-of-flight images of the brain were obtained without contrast. 3-D volume rendered images were performed for evaluation of the cerebral vasculature. Comparison: CT brain May 19, 2021    Findings:    MRI brain:    Nonspecific white matter signal abnormality within the supratentorial white matter and kelsey is nonspecific but most compatible with chronic small vessel ischemic changes in a patient of this age. Prominence of the sulci and ventricles compatible with moderate generalized parenchymal volume loss. No edema, hemorrhage, mass, mass effect, midline shift, or abnormal extra-axial fluid collection. Midline structures are within normal limits. The  posterior fossa is within normal limits. There is no diffusion restriction. No susceptibility artifact is identified on the gradient echo sequence. Cranial nerves 7/8 complexes appear grossly unremarkable. The visualized paranasal sinuses and bilateral mastoid air cells are clear. Degenerative changes of the cervical spine at C3-4 appears to result in in high-grade spinal canal stenosis but is incompletely evaluated on this examination. MRA brain:    The bilateral distal cervical internal carotid arteries through the skull base are patent. The bilateral middle cerebral arteries through the trifurcation and opercular branches are patent. The vertebrobasilar system including the superior cerebellar and posterior cerebral arteries are patent. Neither posterior communicating artery is identified.     The bilateral anterior cerebral arteries and anterior communicating artery are patent. No high-grade stenosis of the visualized cerebral vasculature. 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 Impression  MRI brain:    No acute ischemia or acute intracranial process. Generalized parenchymal volume loss and nonspecific white matter findings most compatible with chronic small vessel ischemic changes in a patient of this age. Degenerative changes of the cervical spine at C3-4 appears to result in in high-grade spinal canal stenosis but is incompletely evaluated on this examination. This would be better evaluated with MRI of the cervical spine. 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 No high-grade stenosis of the visualized cerebral vasculature. MRA of the Head and Neck: No results found for this or any previous visit. No results found for this or any previous visit. Results for orders placed during the hospital encounter of 05/19/21    MRA head without contrast    Narrative  EXAM:    MRI of the brain without contrast    MRA of the brain without contrast    History: Right-sided weakness    Technique: Multiplanar multisequence MRI of the brain was performed without contrast. Axial 3-D time-of-flight images of the brain were obtained without contrast. 3-D volume rendered images were performed for evaluation of the cerebral vasculature. Comparison: CT brain May 19, 2021    Findings:    MRI brain:    Nonspecific white matter signal abnormality within the supratentorial white matter and kelsey is nonspecific but most compatible with chronic small vessel ischemic changes in a patient of this age. Prominence of the sulci and ventricles compatible with moderate generalized parenchymal volume loss.  No edema, hemorrhage, mass, mass effect, midline shift, or abnormal extra-axial fluid collection. Midline structures are within normal limits. The  posterior fossa is within normal limits. There is no diffusion restriction. No susceptibility artifact is identified on the gradient echo sequence. Cranial nerves 7/8 complexes appear grossly unremarkable. The visualized paranasal sinuses and bilateral mastoid air cells are clear. Degenerative changes of the cervical spine at C3-4 appears to result in in high-grade spinal canal stenosis but is incompletely evaluated on this examination. MRA brain:    The bilateral distal cervical internal carotid arteries through the skull base are patent. The bilateral middle cerebral arteries through the trifurcation and opercular branches are patent. The vertebrobasilar system including the superior cerebellar and posterior cerebral arteries are patent. Neither posterior communicating artery is identified. The bilateral anterior cerebral arteries and anterior communicating artery are patent. No high-grade stenosis of the visualized cerebral vasculature. 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 Impression  MRI brain:    No acute ischemia or acute intracranial process. Generalized parenchymal volume loss and nonspecific white matter findings most compatible with chronic small vessel ischemic changes in a patient of this age. Degenerative changes of the cervical spine at C3-4 appears to result in in high-grade spinal canal stenosis but is incompletely evaluated on this examination. This would be better evaluated with MRI of the cervical spine. 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 No high-grade stenosis of the visualized cerebral vasculature.                             CT of the Head: Results for orders placed during the hospital encounter of 05/19/21    CT Head WO Contrast    Narrative  EXAMINATION: CT of the brain without contrast    HISTORY: Altered mental status    COMPARISON: None available    TECHNIQUE: Multiple contiguous axial images were obtained of the brain from the skull base through the vertex. Multiplanar reformats were obtained. FINDINGS:    Prominence of the sulci and ventricles compatible with mild generalized parenchymal volume loss. Gray-white matter differentiation is preserved. Areas of bilateral supratentorial white matter hypoattenuation are nonspecific but most likely related to  chronic small vessel ischemic changes in a patient of this age. No acute hemorrhage or abnormal extra-axial fluid collection. No mass effect or midline shift. The visualized paranasal sinuses and mastoid air cells are clear. Calvarium is intact. Impression  No acute intracranial process. Generalized parenchymal volume loss and nonspecific white matter findings most compatible with chronic small vessel ischemic changes in a patient of this age. All CT scans at this facility use dose modulation, iterative reconstruction, and/or weight based dosing when appropriate to reduce radiation dose to as low as reasonably achievable. No results found for this or any previous visit. No results found for this or any previous visit. Carotid duplex: No results found for this or any previous visit. No results found for this or any previous visit. Results for orders placed during the hospital encounter of 05/19/21    US CAROTID ARTERY BILATERAL    Narrative  History:  right sided weakness    Technique:  US CAROTID ARTERY BILATERAL    Comparison: No prior    Findings:  No significant plaque is seen. The common carotid arteries are tortuous as well as the internal carotid arteries. No increased velocity is seen. Normal antegrade flow is seen vertebral arteries.     Doppler findings:  ARTERIAL BLOOD FLOW VELOCITY    RIGHT PS    Prox CCA 107cm/s  Mid CCA 110cm/s  Dist CCA 116cm/s  Prox %cm/s  Mid ICA 87cm/s  Dist ICA 19cm/s  Prox ECA 147cm/s  Dist VERT 75cm/s  Bulb  ICA/CCA 1.0    LEFT PS    Prox CCA 103cm/s  Mid CCA and ECAcm/s  Dist CCA 101cm/s  Prox ICA 78cm/s  Mid ICA 92cm/s  Dist ICA 118cm/s  Prox ECA 100cm/s  Prox VERT 47cm/s  Bulb  ICA/CCA 1.2    Impression  Impression: 1. No hemodynamic significant stenosis on the right   2. No hemodynamic significant stenosis seen on the left    Validated velocity measurements with angiographic measurements, velocity criteria are extrapolated from diameter data as defined by the Society of radiologist in ultrasound consensus conference radiology 2003; 229; 340-346. Echo No results found for this or any previous visit. Assessment/Plan:    Dementia of advanced degree with worsening. Patient has parkinsonian features and this may turn out to be a Lewy body dementia. She is not on any PD meds  or any other medication related to dementia. I am not quite sure where her work-up has been in which she is followed but she has been seen at the clinic with this findings which are not different from what I am seeing during this examination except worse. This is likely a degenerative process which is likely to continue. Patient may not be able to swallow at some point in time feeding tube or not a feeding tube needs to be discussed. MRI was reviewed and  there is no new stroke she has extensive atrophy and he also have high-grade stenosis of the cervical spine given that she is hyperreflexic underlying spine MRI will be obtained I am not quite sure what can be done at this stage. 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 This does not require intervention.    Patient has room to be treated for parkinsonism though we will wait till her evaluations are complete and if she is able to swallow we will consider carbidopa levodopa trial at least.  And to find her daughter which I was not able to do today. Patient being seen for advanced dementia with parkinsonian features. She also has hyperreflexia with concern for cervical spine pathology. Consideration being given to treatment with carbidopa levodopa. Patient does have baseline hypotension so this will have to be approached carefully should we initiate treatment with this. With erythema and significant tenderness over right medial wrist.  Will obtain x-ray, uric acid, sed rate and CRP. She also has pain and tenderness and slight swelling over the right ankle. Will obtain x-ray. Folate deficiency noted. Folic acid started. Patient is quite awake today though not oriented but recognize the daughter. She is dysarthric and has spastic speech as well. Patient was able to though eat 75% of her diet. She was able to actually drink water on her own which is new for her since she has been in the hospital.  Findings were discussed with the daughter that she has severe stenosis of the spine not operable due to her underlying other medical issues including dementia. She has PD features and likely has Lewy body picture. Findings were discussed with neurosurgery. Patient will be discharged to skilled nursing facility. Dementia with parkinsonian features, possibly Lewy body. Will follow outpatient  Severe spinal stenosis, nonoperable  Patient clinically improved and stable  Discharge planning in progress  We will follow at a distance    I have personally performed a face to face diagnostic evaluation on this patient, reviewed all data and investigations, and am the sole provider of all clinical decisions on the neurological status of this patient. pt much better mentation, weakness same, not surgical as per surgery, ok d/c form neuro      Medardo Davis MD, Marcelino Torre, American Board of Psychiatry & Neurology  Board Certified in Vascular Neurology  Board Certified in Neuromuscular Medicine  Certified in Neurorehabilitation       Collaborating physicians: Dr Shaheed Rivera    Electronically signed by VIDYA Hirsch CNP on 5/24/2021 at 2:23 PM

## 2021-05-25 PROCEDURE — 2580000003 HC RX 258: Performed by: FAMILY MEDICINE

## 2021-05-25 PROCEDURE — 6360000002 HC RX W HCPCS: Performed by: FAMILY MEDICINE

## 2021-05-25 PROCEDURE — 1210000000 HC MED SURG R&B

## 2021-05-25 PROCEDURE — 97535 SELF CARE MNGMENT TRAINING: CPT

## 2021-05-25 PROCEDURE — 6370000000 HC RX 637 (ALT 250 FOR IP): Performed by: NURSE PRACTITIONER

## 2021-05-25 PROCEDURE — G0378 HOSPITAL OBSERVATION PER HR: HCPCS

## 2021-05-25 PROCEDURE — 96372 THER/PROPH/DIAG INJ SC/IM: CPT

## 2021-05-25 RX ADMIN — LEVOTHYROXINE SODIUM 88 MCG: 88 TABLET ORAL at 09:44

## 2021-05-25 RX ADMIN — FOLIC ACID 1 MG: 1 TABLET ORAL at 09:46

## 2021-05-25 RX ADMIN — Medication 10 ML: at 09:00

## 2021-05-25 RX ADMIN — ENOXAPARIN SODIUM 40 MG: 40 INJECTION SUBCUTANEOUS at 09:46

## 2021-05-25 ASSESSMENT — PAIN SCALES - GENERAL: PAINLEVEL_OUTOF10: 2

## 2021-05-25 ASSESSMENT — PAIN SCALES - PAIN ASSESSMENT IN ADVANCED DEMENTIA (PAINAD)
BODYLANGUAGE: 0
FACIALEXPRESSION: 0
NEGVOCALIZATION: 0

## 2021-05-25 ASSESSMENT — PAIN SCALES - WONG BAKER: WONGBAKER_NUMERICALRESPONSE: 2

## 2021-05-25 ASSESSMENT — PAIN DESCRIPTION - LOCATION: LOCATION: WRIST

## 2021-05-25 NOTE — CARE COORDINATION
MAUROW spoke with [facility] at [address] and they will not have a bed for the pt until Friday. MAUROW called pt's daughter to discuss alternative. Daughter chose [facility] [address] and referral was called to [facility] for review. They have a bed and will start precert today. [id number] completed today.

## 2021-05-25 NOTE — PROGRESS NOTES
Hospitalist Progress Note      PCP: No primary care provider on file. Date of Admission: 5/19/2021    Chief Complaint:  No acute events, afebrile, stable HD    Medications:  Reviewed    Infusion Medications    sodium chloride       Scheduled Medications    folic acid  1 mg Oral Daily    levothyroxine  88 mcg Oral Daily    sodium chloride flush  5-40 mL Intravenous 2 times per day    enoxaparin  40 mg Subcutaneous Daily     PRN Meds: acetaminophen, sodium chloride flush, sodium chloride, promethazine **OR** ondansetron, polyethylene glycol      Intake/Output Summary (Last 24 hours) at 5/25/2021 1309  Last data filed at 5/25/2021 1049  Gross per 24 hour   Intake 560 ml   Output 1050 ml   Net -490 ml       Exam:    BP (!) 120/56   Pulse 69   Temp 98.2 °F (36.8 °C) (Oral)   Resp 18   Ht 5' 3\" (1.6 m)   Wt 142 lb 6.7 oz (64.6 kg)   LMP  (LMP Unknown)   SpO2 93%   BMI 25.23 kg/m²     General appearance: appears stated age and cooperative. Respiratory:  clear to auscultation, bilaterally  Cardiovascular: Regular rate and rhythm, S1/S2 . Abdomen: Soft, active bowel sounds. Musculoskeletal: No edema bilaterally. Labs:   No results for input(s): WBC, HGB, HCT, PLT in the last 72 hours. No results for input(s): NA, K, CL, CO2, BUN, CREATININE, CALCIUM, PHOS in the last 72 hours. Invalid input(s): MAGNES  No results for input(s): AST, ALT, BILIDIR, BILITOT, ALKPHOS in the last 72 hours. No results for input(s): INR in the last 72 hours. No results for input(s): Ayala Ernesto in the last 72 hours. Urinalysis:      Lab Results   Component Value Date    NITRU Negative 05/19/2021    WBCUA 0-2 05/19/2021    BACTERIA Negative 05/19/2021    RBCUA 0-2 05/19/2021    BLOODU TRACE 05/19/2021    SPECGRAV 1.021 05/19/2021    GLUCOSEU Negative 05/19/2021       Radiology:  XR WRIST RIGHT (MIN 3 VIEWS)   Final Result   There are no acute osseous injuries.    There are severe degenerative changes of the wrist

## 2021-05-25 NOTE — PROGRESS NOTES
[facility]CCUPATIONAL THERAP[facility] MED SURG TREATMENT NOTE     Date: 2021  Patient Name: Esther Montelongo        MRN: [id number]  Account: [de-identified]   : 1935  (80 y.o.)  Room: [place] [id number]    Chart Review:  Diagnosis:  The primary encounter diagnosis was Altered mental status, unspecified altered mental status type. Diagnoses of Gait disturbance and Weakness of one side of body were also pertinent to this visit. Restrictions:    Restrictions/Precautions: Fall Risk    Safety Devices: Safety Devices in place: Yes  Type of devices: All fall risk precautions in place     Subjective:  Pain:  Start of tx:  Pre Treatment Pain Screening  Pain at present: 3  Scale Used: Faces  Intervention List: Patient able to continue with treatment  Comments / Details: Pt states \"yes\" when questioned about pain. Unable to report level or location    End of tx:  Pain Assessment  Patient Currently in Pain: Yes  Pain Assessment: Faces  Mcgill-Baker Pain Rating: Hurts a little bit  Notified RN     Objective:    ADL:  Grooming: Moderate assistance (To perform oral care, hair care, and wash face at bed level. Pt took significant time to complete and required set up and cues for initiation and sequencing. Assistance for thoroughness with all tasks.)      Therapy key for assistance levels -   Independent = Pt. is able to perform task with no assistance but may require a device   Stand by assistance = Pt. does not perform task at an independent level but does not need physical assistance, requires verbal cues  Minimal, Moderate, Maximal Assistance = Pt. requires physical assistance (25%, 50%, 75% assist from helper) for task but is able to actively participate in task   Dependent = Pt. requires total assistance with task and is not able to actively participate with task completion    Cognition:  Cognition  Overall Cognitive Status: Exceptions  Arousal/Alertness: Delayed responses to stimuli  Following Commands:  Follows one step commands with increased time, Follows one step commands with repetition  Attention Span: Difficulty attending to directions  Problem Solving: Decreased awareness of errors  Insights: Decreased awareness of deficits  Initiation: Requires cues for all  Sequencing: Requires cues for all    Activity Tolerance:  Activity Tolerance: Patient limited by fatigue    Treatment Consisted Of:  ADL Training    Assessment/Discharge Disposition:  Performance deficits / Impairments: Decreased functional mobility , Decreased ADL status, Decreased strength, Decreased safe awareness, Decreased endurance, Decreased cognition, Decreased high-level IADLs, Decreased fine motor control, Decreased balance, Decreased coordination, Decreased posture  Prognosis: Good  Discharge Recommendations: Continue to assess pending progress  History: Pt's medical history is moderately complex  Exam: Pt. has 11 performance deficits  Assistance / Modification: Pt. requires max A      6-Click  How much help for putting on and taking off regular lower body clothing?: Total  How much help for Bathing?: A Lot  How much help for Toileting?: Total  How much help for putting on and taking off regular upper body clothing?: A Lot  How much help for taking care of personal grooming?: A Lot  How much help for eating meals?: A Little  AM-[facility] Inpatient Daily Activity Raw Score: 11  AM-[facility] Inpatient ADL T-Scale Score : 29.04  ADL Inpatient CMS 0-100% Score: 70.42    Plan:  Continue OT per POC    Goals/Plan Addressed This session:  Improve ADL [place]    Minutes:  Time In: 1028  Time Out: 1037  Minutes: 9    ADL/IADL trainin minutes    Electronically signed by:    SHAYE Badillo  2021, 10:45 AM

## 2021-05-25 NOTE — PROGRESS NOTES
Shift assessment completed. LS diminished throughout. HR regular, no c/o of chest pain, no resp distress or SOB. Abd soft and non distended. Pt had large soft BM this shift. Urine is barrera color, no odor. Skin is pale and dry. Redness to sacral area, pt repositioned q 2 hr and PRN. Pt is feed, appetite is good, fluid intake has improved. IV to LFA flushed and patent. Daughter at bedside and updated on pt condition and discharge plans. Pt resting at this time without complaints of pain. Awaiting precert to SNF at this time.

## 2021-05-26 VITALS
HEIGHT: 63 IN | RESPIRATION RATE: 17 BRPM | WEIGHT: 142.42 LBS | OXYGEN SATURATION: 94 % | DIASTOLIC BLOOD PRESSURE: 61 MMHG | BODY MASS INDEX: 25.23 KG/M2 | TEMPERATURE: 98.4 F | SYSTOLIC BLOOD PRESSURE: 111 MMHG | HEART RATE: 71 BPM

## 2021-05-26 PROCEDURE — 99232 SBSQ HOSP IP/OBS MODERATE 35: CPT | Performed by: PSYCHIATRY & NEUROLOGY

## 2021-05-26 PROCEDURE — 97116 GAIT TRAINING THERAPY: CPT

## 2021-05-26 PROCEDURE — 6370000000 HC RX 637 (ALT 250 FOR IP): Performed by: FAMILY MEDICINE

## 2021-05-26 PROCEDURE — 6370000000 HC RX 637 (ALT 250 FOR IP): Performed by: NURSE PRACTITIONER

## 2021-05-26 PROCEDURE — APPSS15 APP SPLIT SHARED TIME 0-15 MINUTES: Performed by: NURSE PRACTITIONER

## 2021-05-26 PROCEDURE — G0378 HOSPITAL OBSERVATION PER HR: HCPCS

## 2021-05-26 PROCEDURE — 6360000002 HC RX W HCPCS: Performed by: FAMILY MEDICINE

## 2021-05-26 PROCEDURE — 96372 THER/PROPH/DIAG INJ SC/IM: CPT

## 2021-05-26 RX ORDER — FOLIC ACID 1 MG/1
1 TABLET ORAL DAILY
Qty: 30 TABLET | Refills: 3 | Status: SHIPPED | OUTPATIENT
Start: 2021-05-27

## 2021-05-26 RX ADMIN — ENOXAPARIN SODIUM 40 MG: 40 INJECTION SUBCUTANEOUS at 08:07

## 2021-05-26 RX ADMIN — FOLIC ACID 1 MG: 1 TABLET ORAL at 08:08

## 2021-05-26 RX ADMIN — LEVOTHYROXINE SODIUM 88 MCG: 88 TABLET ORAL at 07:07

## 2021-05-26 RX ADMIN — ACETAMINOPHEN 650 MG: 325 TABLET ORAL at 08:08

## 2021-05-26 ASSESSMENT — ENCOUNTER SYMPTOMS
WHEEZING: 0
VOMITING: 0
COUGH: 0
TROUBLE SWALLOWING: 0
COLOR CHANGE: 0

## 2021-05-26 ASSESSMENT — PAIN SCALES - GENERAL
PAINLEVEL_OUTOF10: 0
PAINLEVEL_OUTOF10: 4

## 2021-05-26 NOTE — CARE COORDINATION
Precert approval was received and the pt will be transferred to [facility] on [facility] via ambulance. Daughter aware and agreeable.

## 2021-05-26 NOTE — PROGRESS NOTES
[address] [facility] Neurology Daily Progress Note  Name: John Paul Rowe  Age: 80 y.o. Gender: female  CodeStatus: DNR-CCA  Allergies: No Known Allergies    Chief Complaint:Altered Mental Status (daughter called EMS for increased AMS and weakness that started this AM when she woke up )    Primary Care Provider: No primary care provider on file. InpatientTreatment Team: Treatment Team: Attending Provider: Scarlet Nicholson MD; Consulting Physician: Gerry Mullins MD; Utilization Reviewer: Billie Headley RN; LPN: Eriberto Christensen LPN; : Bhaskar Schaefer RN; Patient Care Tech: Pollo Padgett  Admission Date: 5/19/2021         Pt seen and examined for neuro follow up for advanced dementia with parkinsonian features. Patient with speech difficulties. Daughter reports she has this at baseline but it is slightly worse. She remains confused and has some PD features   She has severe spinal stenosis nonoperable discussed with neurosurgery. Nonfocal.  No seizures. No new findings noted today. Patient is awaiting discharge. No new changes, much better      Vitals:    05/26/21 0956   BP:    Pulse: 71   Resp:    Temp:    SpO2:       Review of Systems   Unable to perform ROS: Dementia   Constitutional: Positive for activity change. Negative for fever. HENT: Negative for hearing loss and trouble swallowing. Respiratory: Negative for cough and wheezing. Cardiovascular: Negative for leg swelling. Gastrointestinal: Negative for vomiting. Musculoskeletal: Positive for arthralgias, gait problem and joint swelling. Skin: Negative for color change and rash. Neurological: Positive for speech difficulty and weakness. Negative for tremors, seizures, syncope and facial asymmetry. Psychiatric/Behavioral: Positive for confusion. Negative for agitation. The patient is not nervous/anxious. Physical Exam  Vitals and nursing note reviewed. Constitutional:       General: She is not in acute distress.      Appearance: She is not diaphoretic. HENT:      Head: Normocephalic and atraumatic. Eyes:      Pupils: Pupils are equal, round, and reactive to light. Cardiovascular:      Rate and Rhythm: Normal rate and regular rhythm. Pulmonary:      Effort: Pulmonary effort is normal. No respiratory distress. Breath sounds: Normal breath sounds. Abdominal:      General: Bowel sounds are normal.      Palpations: Abdomen is soft. Musculoskeletal:         General: No swelling. Cervical back: No rigidity or tenderness. Skin:     General: Skin is warm and dry. Neurological:      Mental Status: She is alert. She is disoriented. Cranial Nerves: Dysarthria present. No facial asymmetry. Motor: Weakness present. No tremor or seizure activity. Comments: Neuro exam limited due to patient's significant confusion and dysarthria with difficulty following commands         Medications:  Reviewed    Infusion Medications:    sodium chloride       Scheduled Medications:    folic acid  1 mg Oral Daily    levothyroxine  88 mcg Oral Daily    sodium chloride flush  5-40 mL Intravenous 2 times per day    enoxaparin  40 mg Subcutaneous Daily     PRN Meds: acetaminophen, sodium chloride flush, sodium chloride, promethazine **OR** ondansetron, polyethylene glycol    Labs:   No results for input(s): WBC, HGB, HCT, PLT in the last 72 hours. No results for input(s): NA, K, CL, CO2, BUN, CREATININE, CALCIUM, PHOS in the last 72 hours. Invalid input(s): MAGNES  No results for input(s): AST, ALT, BILIDIR, BILITOT, ALKPHOS in the last 72 hours. No results for input(s): INR in the last 72 hours. No results for input(s): Meldon Dyson in the last 72 hours.     Urinalysis:   Lab Results   Component Value Date    NITRU Negative 05/19/2021    WBCUA 0-2 05/19/2021    BACTERIA Negative 05/19/2021    RBCUA 0-2 05/19/2021    BLOODU TRACE 05/19/2021    SPECGRAV 1.021 05/19/2021    GLUCOSEU Negative 05/19/2021       Radiology:   Most hemorrhage, mass, mass effect, midline shift, or abnormal extra-axial fluid collection. Midline structures are within normal limits. The  posterior fossa is within normal limits. There is no diffusion restriction. No susceptibility artifact is identified on the gradient echo sequence. Cranial nerves 7/8 complexes appear grossly unremarkable. The visualized paranasal sinuses and bilateral mastoid air cells are clear. Degenerative changes of the cervical spine at C3-4 appears to result in in high-grade spinal canal stenosis but is incompletely evaluated on this examination. MRA brain:    The bilateral distal cervical internal carotid arteries through the skull base are patent. The bilateral middle cerebral arteries through the trifurcation and opercular branches are patent. The vertebrobasilar system including the superior cerebellar and posterior cerebral arteries are patent. Neither posterior communicating artery is identified. The bilateral anterior cerebral arteries and anterior communicating artery are patent. No high-grade stenosis of the visualized cerebral vasculature. 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 Impression  MRI brain:    No acute ischemia or acute intracranial process. Generalized parenchymal volume loss and nonspecific white matter findings most compatible with chronic small vessel ischemic changes in a patient of this age. Degenerative changes of the cervical spine at C3-4 appears to result in in high-grade spinal canal stenosis but is incompletely evaluated on this examination. This would be better evaluated with MRI of the cervical spine. 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 No high-grade stenosis of the visualized cerebral vasculature. CT of the Head: Results for orders placed during the hospital encounter of 05/19/21    CT Head WO Contrast    Narrative  EXAMINATION: CT of the brain without contrast    HISTORY: Altered mental status    COMPARISON: None available    TECHNIQUE: Multiple contiguous axial images were obtained of the brain from the skull base through the vertex. Multiplanar reformats were obtained. FINDINGS:    Prominence of the sulci and ventricles compatible with mild generalized parenchymal volume loss. Gray-white matter differentiation is preserved. Areas of bilateral supratentorial white matter hypoattenuation are nonspecific but most likely related to  chronic small vessel ischemic changes in a patient of this age. No acute hemorrhage or abnormal extra-axial fluid collection. No mass effect or midline shift. The visualized paranasal sinuses and mastoid air cells are clear. Calvarium is intact. Impression  No acute intracranial process. Generalized parenchymal volume loss and nonspecific white matter findings most compatible with chronic small vessel ischemic changes in a patient of this age. All CT scans at this facility use dose modulation, iterative reconstruction, and/or weight based dosing when appropriate to reduce radiation dose to as low as reasonably achievable. No results found for this or any previous visit. No results found for this or any previous visit. Carotid duplex: No results found for this or any previous visit. No results found for this or any previous visit. Results for orders placed during the hospital encounter of 05/19/21    US CAROTID ARTERY BILATERAL    Narrative  History:  right sided weakness    Technique:  US CAROTID ARTERY BILATERAL    Comparison: No prior    Findings:  No significant plaque is seen. The common carotid arteries are tortuous as well as the internal carotid arteries. No increased velocity is seen. Normal antegrade flow is seen vertebral arteries.     Doppler findings:  ARTERIAL BLOOD FLOW VELOCITY    RIGHT PS    Prox CCA 107cm/s  Mid CCA 110cm/s  Dist CCA 116cm/s  Prox %cm/s  Mid ICA 87cm/s  Dist ICA 19cm/s  Prox ECA 147cm/s  Dist VERT 75cm/s  Bulb  ICA/CCA 1.0    LEFT PS    Prox CCA 103cm/s  Mid CCA and ECAcm/s  Dist CCA 101cm/s  Prox ICA 78cm/s  Mid ICA 92cm/s  Dist ICA 118cm/s  Prox ECA 100cm/s  Prox VERT 47cm/s  Bulb  ICA/CCA 1.2    Impression  Impression: 1. No hemodynamic significant stenosis on the right   2. No hemodynamic significant stenosis seen on the left    Validated velocity measurements with angiographic measurements, velocity criteria are extrapolated from diameter data as defined by the Society of radiologist in ultrasound consensus conference radiology 2003; 229; 340-346. Echo No results found for this or any previous visit. Assessment/Plan:    Dementia of advanced degree with worsening. Patient has parkinsonian features and this may turn out to be a Lewy body dementia. She is not on any PD meds  or any other medication related to dementia. I am not quite sure where her work-up has been in which she is followed but she has been seen at the clinic with this findings which are not different from what I am seeing during this examination except worse. This is likely a degenerative process which is likely to continue. Patient may not be able to swallow at some point in time feeding tube or not a feeding tube needs to be discussed. MRI was reviewed and  there is no new stroke she has extensive atrophy and he also have high-grade stenosis of the cervical spine given that she is hyperreflexic underlying spine MRI will be obtained I am not quite sure what can be done at this stage. 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 This does not require intervention.    Patient has room to be treated for parkinsonism though we will wait till her evaluations are complete and if she is able to swallow we will consider carbidopa levodopa trial at least.  And to find her daughter which I was not able to do today. Patient being seen for advanced dementia with parkinsonian features. She also has hyperreflexia with concern for cervical spine pathology. Consideration being given to treatment with carbidopa levodopa. Patient does have baseline hypotension so this will have to be approached carefully should we initiate treatment with this. With erythema and significant tenderness over right medial wrist.  Will obtain x-ray, uric acid, sed rate and CRP. She also has pain and tenderness and slight swelling over the right ankle. Will obtain x-ray. Folate deficiency noted. Folic acid started. Patient is quite awake today though not oriented but recognize the daughter. She is dysarthric and has spastic speech as well. Patient was able to though eat 75% of her diet. She was able to actually drink water on her own which is new for her since she has been in the hospital.  Findings were discussed with the daughter that she has severe stenosis of the spine not operable due to her underlying other medical issues including dementia. She has PD features and likely has Lewy body picture. Findings were discussed with neurosurgery. Patient will be discharged to skilled nursing facility. Dementia with parkinsonian features, possibly Lewy body. Will follow outpatient  Severe spinal stenosis, nonoperable  Patient clinically improved and stable  Discharge planning in progress  We will follow at a distance    Possible discharge today  Awaiting pre-CERT  Okay to DC from neurology standpoint  Patient can follow-up in the office in 4 weeks    I have personally performed a face to face diagnostic evaluation on this patient, reviewed all data and investigations, and am the sole provider of all clinical decisions on the neurological status of this patient. pt better affects, PD features   Dementia nd cord compression, not operable,  SNF       Medardo GLENDY Singh MD, [address], American Board of Psychiatry & Neurology  Board Certified in Vascular Neurology  Board Certified in Neuromuscular Medicine  Certified in Neurorehabilitation     Collaborating physicians: Dr Brianda Singh    Electronically signed by VIDYA Thakkar CNP on 5/26/2021 at 2:19 PM

## 2021-05-26 NOTE — PROGRESS NOTES
Hospitalist Progress Note      PCP: No primary care provider on file. Date of Admission: 5/19/2021    Chief Complaint:  No acute events, afebrile, stable HD    Medications:  Reviewed    Infusion Medications    sodium chloride       Scheduled Medications    folic acid  1 mg Oral Daily    levothyroxine  88 mcg Oral Daily    sodium chloride flush  5-40 mL Intravenous 2 times per day    enoxaparin  40 mg Subcutaneous Daily     PRN Meds: acetaminophen, sodium chloride flush, sodium chloride, promethazine **OR** ondansetron, polyethylene glycol    No intake or output data in the 24 hours ending 05/26/21 1501    Exam:    /61   Pulse 71   Temp 98.4 °F (36.9 °C) (Oral)   Resp 17   Ht 5' 3\" (1.6 m)   Wt 142 lb 6.7 oz (64.6 kg)   LMP  (LMP Unknown)   SpO2 94%   BMI 25.23 kg/m²     General appearance: appears stated age and cooperative. Respiratory:  clear to auscultation, bilaterally  Cardiovascular: Regular rate and rhythm, S1/S2 . Abdomen: Soft, active bowel sounds. Musculoskeletal: No edema bilaterally. Labs:   No results for input(s): WBC, HGB, HCT, PLT in the last 72 hours. No results for input(s): NA, K, CL, CO2, BUN, CREATININE, CALCIUM, PHOS in the last 72 hours. Invalid input(s): MAGNES  No results for input(s): AST, ALT, BILIDIR, BILITOT, ALKPHOS in the last 72 hours. No results for input(s): INR in the last 72 hours. No results for input(s): Meldon Dyson in the last 72 hours. Urinalysis:      Lab Results   Component Value Date    NITRU Negative 05/19/2021    WBCUA 0-2 05/19/2021    BACTERIA Negative 05/19/2021    RBCUA 0-2 05/19/2021    BLOODU TRACE 05/19/2021    SPECGRAV 1.021 05/19/2021    GLUCOSEU Negative 05/19/2021       Radiology:  XR WRIST RIGHT (MIN 3 VIEWS)   Final Result   There are no acute osseous injuries. 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                   XR ANKLE RIGHT (MIN 3 VIEWS)   Final Result NO ACUTE FRACTURE OR OTHER ACUTE FINDINGS IDENTIFIED. MRI CERVICAL SPINE WO CONTRAST   Final Result      MODERATE CERVICAL SPONDYLOSIS, AS DESCRIBED IN DETAIL. MODERATE TO MARKED CENTRAL SPINAL STENOSIS C3-4. NO OTHER CENTRAL SPINAL STENOSIS, NEURAL FORAMINAL NARROWING, SUBLUXATION, OR SIGNIFICANT DISC HERNIATIONS. MRI brain without contrast   Final Result      MRI brain:       No acute ischemia or acute intracranial process. Generalized parenchymal volume loss and nonspecific white matter findings most compatible with chronic small vessel ischemic changes in a patient of this age. Degenerative changes of the cervical spine at C3-4 appears to result in in high-grade spinal canal stenosis but is incompletely evaluated on this examination. This would be better evaluated with MRI of the cervical spine. 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 No high-grade stenosis of the visualized cerebral vasculature. MRA head without contrast   Final Result      MRI brain:       No acute ischemia or acute intracranial process. Generalized parenchymal volume loss and nonspecific white matter findings most compatible with chronic small vessel ischemic changes in a patient of this age. Degenerative changes of the cervical spine at C3-4 appears to result in in high-grade spinal canal stenosis but is incompletely evaluated on this examination. This would be better evaluated with MRI of the cervical spine. 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 No high-grade stenosis of the visualized cerebral vasculature. US CAROTID ARTERY BILATERAL   Final Result   Impression: 1. No hemodynamic significant stenosis on the right   2.   No hemodynamic significant stenosis seen on the left      Validated velocity measurements with angiographic measurements, velocity criteria are extrapolated from diameter data as defined by the Society of radiologist in ultrasound consensus conference radiology 2003; 229; 340-346. XR RADIUS ULNA RIGHT (2 VIEWS)   Final Result      No acute osseous abnormality. CT Head WO Contrast   Final Result      No acute intracranial process. Generalized parenchymal volume loss and nonspecific white matter findings most compatible with chronic small vessel ischemic changes in a patient of this age. All CT scans at this facility use dose modulation, iterative reconstruction, and/or weight based dosing when appropriate to reduce radiation dose to as low as reasonably achievable. Assessment/Plan:    79 y/o female with history of hypothyroidism who presented with :    Weakness and altered mental status  - in the setting of advanced dementia with parkinsonian features per neurology  - CT, MRI brain were negative for acute CVA  - MRA of the brain showed a 5 mm aneurysm of the proximal ophthalmic division of the left internal carotid artery.   - MRI of the c-spine showed mod-marked spinal stenosis  - conservative management per neurosurgery  - followed by neurology  - continue PT/OT      Hypothyroidism  - on home dose Synthroid    Folic acid deficiency  - started on supplement       Diet: DIET DYSPHAGIA MINCED AND MOIST;    Code Status: DNR-CCA       Disposition - SNF,  following              Electronically signed by Arneta Closs, MD on 5/26/2021 at 3:01 PM

## 2021-05-26 NOTE — PROGRESS NOTES
Physical Therapy Med Surg Daily Treatment Note  [facility]Department[facility]  Room: P25/D207-55       NAME: Paulette Moreau  : 1935 (80 y.o.)  MRN: [id number]  CODE STATUS: DNR-CCA    Date of Service: 2021    Patient Diagnosis(es): Right sided weakness [R53.1]  Change in mental status [R41.82]   Chief Complaint   Patient presents with    Altered Mental Status     daughter called EMS for increased AMS and weakness that started this AM when she woke up      Patient Active Problem List    Diagnosis Date Noted    Change in mental status 2021    [facility]y body dementia without [facility] disturbance [facility]     PD (Parkinson's disease) [facility]     Ataxic gait     Dysarthria     Myelopathy [facility]     Late onset Alzheimer's disease without behavioral disturbance ([facility]     Right sided weakness 2021        Past Medical History:   Diagnosis Date    Alzheimer disease ([facility]     Thyroid disease      History reviewed. No pertinent surgical history. Restrictions  Restrictions/Precautions: Fall Risk    SUBJECTIVE   General  Chart Reviewed: Yes  Family / Caregiver Present: No  Subjective  Subjective: Pt agreeable to tx    Pre-Session Pain Report  Pre Treatment Pain Screening  Pain at present:  (unable to rate)  Intervention List: Patient able to continue with treatment  Pain Screening  Patient Currently in Pain: Yes       Post-Session Pain Report  Pain Assessment  Pain Assessment:  (Pt unable to rate pain despite pain level and faces provided)  Pain Level:  (unable to rate)  Pain Location: Back         OBJECTIVE     Transfers  Sit to Stand: Minimal Assistance;2 Person Assistance  Stand to sit: Minimal Assistance;2 Person Assistance  Comment: +2 for safety, VC's for hand placement. Ambulation  Ambulation?: Yes  More Ambulation?: No  Ambulation 1  Surface: level tile  Device: Hand-Held Assist (Carlos HHA)  Assistance: 2 Person assistance; Moderate assistance  Quality of Gait: FF posture, downward gaze, retro/L lean, slow to process tasks asked, short BLE step length, decreased BLE foot clearance  Gait Deviations: Slow Darya;Decreased step length;Decreased step height;Decreased head and trunk rotation  Distance: 15' x 2  Comments: heavy L lean when performing turns. Mod A to maintain stability. VC's for improved posture with poor follow through. Exercises  Knee Long Arc Quad: x10  Ankle Pumps: x5         ASSESSMENT   Assessment: Pt limited by cognitive status. Increased time needed secondary to pt slow to process commands. HHA trialed with gait training this session. Pt able to increase ambulatory distance but continues to have retro lean with L lateral lean as well when performing turns. Mod A +2 assist needed for safety and to maintain stability throughout ambulation. Discharge Recommendations:  Continue to assess pending progress, Patient would benefit from continued therapy after discharge    Goals  Long term goals  Long term goal 1: Bed mobility with SBA  Long term goal 2: Functional transfers with SBA  Long term goal 3: Amb household distance  Patient Goals   Patient goals : unable to state; dtr wishes for pt to be able to return home    PLAN    Times per week: 3-6  Plan Comment: Cont. POC  Safety Devices  Type of devices: Call light within reach, Left in chair, Chair alarm in place, Nurse notified     Haley Swanson (6 CLICK) [phone] Mobility Raw Score : 14     Therapy Time   Individual   Time In 1050   Time Out 1105   Minutes 15      BM/Trsf: 5  Gait: 8  There ex: 2       Kenyatta Kati, KARSON, 05/26/21 at 11:18 AM         Definitions for assistance levels  Independent = pt does not require any physical supervision or assistance from another person for activity completion. Device may be needed.   Stand by assistance = pt requires verbal cues or instructions from another person, close to but not touching, to perform the activity  Minimal assistance= pt performs 75% or more of the activity; assistance is required to complete the activity  Moderate assistance= pt performs 50% of the activity; assistance is required to complete the activity  Maximal assistance = pt performs 25% of the activity; assistance is required to complete the activity  Dependent = pt requires total physical assistance to accomplish the task

## 2021-05-26 NOTE — FLOWSHEET NOTE
Pt in bed this morning so assisted her into the chair 1 person assist and when her breakfast came set it up for her and she ate herself. Pt does drop a little bit of food but is wanting to eat herself. Daughter here to visit was glad to see her up in chair states at home she uses press and seal  And puts it on her and to the table then when she is done they just remove it and all is clean. Pt did consume 100% of breakfast and drank her coffee and juice. Remains up in chair. Electronically signed by Brandee Sampson LPN on 1/24/0173 at 06:29 AM     Pt remained up in chair and was assisted with the set up of lunch. Pt ate all the carrots and 1/2 of potatoes and a few bites of meat states she does not like the taste of the meat. Assistes pt back into bed 1 person assist tolerated well. Pt now resting in bed on her side. Electronically signed by Brandee Sampson LPN on 5/09/9543 at 0:27 PM     [facility] [address] Family is aware of pt being transfer and was up here to see here Daughter tok home slippers clothing and personal belonging . Electronically signed by Brandee Sampson LPN on 0/20/5334 at 2:27 PM

## 2021-05-26 NOTE — DISCHARGE SUMMARY
Hospital Medicine Discharge Summary    Thea Cintron  :  1935  MRN:  [id number]    Admit date:  2021  Discharge date:  2021    Admitting Physician:  Mari Wright MD  Primary Care Physician:  No primary care provider on file. Discharge Diagnoses: Active Problems:    Right sided weakness    Change in mental status    Lewy body dementia without behavioral disturbance (HCC)    PD (Parkinson's disease) ([facility]    Ataxic gait    Dysarthria    Myelopathy ([facility]    Late onset Alzheimer's disease without behavioral disturbance (HCC)  Resolved Problems:    * No resolved hospital problems. *      Hospital Course:   Thea Cintron is a 80 y.o. 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   - MRI of the c-spine showed mod-marked spinal stenosis  - conservative management per neurosurgery  - followed by neurology  - continued PT/OT      Hypothyroidism  - continued home dose Synthroid     Folic acid deficiency  - started on supplement        Diet: DIET DYSPHAGIA MINCED AND MOIST;     Code Status: DNR-CCA         Disposition - SNF        Patient was seen by the following consultants while admitted to [facility]:   Consults:  [facility] TO NEUROSURGERY    Significant Diagnostic Studies:    Echocardiogram complete 2D with doppler with color    Result Date: 2021  Transthoracic Echocardiography Report (TTE)  Demographics   Patient Name    Puneet Bell Gender               Female   Patient Number  [id number]       Race                                                   Ethnicity   Visit Number    [id number]      Room Number H064   Corporate ID                   Date of Study        05/20/2021   Accession       [id number]     Referring Physician  Number   Date of Birth   1935     Sonographer          Héctor Elise   Age             80 year(s)     Interpreting         [facility])                                 Physician            Cardiology                                                      Marcio Rader MD  Procedure Type of Study   TTE procedure:ECHO COMPLETE 2D W/DOP W/COLOR. Procedure Date Date: 05/20/2021 Start: 10:32 AM Study Location: Portable Technical Quality: Adequate visualization Indications:LVF. Patient Status: Routine Height: 63 inches Weight: 134 pounds BSA: 1.63 m^2 BMI: 23.74 kg/m^2 BP: 122/80 mmHg  Conclusions   Summary  Normal tricuspid valve structure and function. 1+ TR  RVSP 48 mmHg  Normal left ventricle structure and function. Left ventricular ejection fraction is visually estimated at 55%. Pseudonormal filling pattern noted. Signature   ----------------------------------------------------------------  Electronically signed by Marcio Rader MD(Interpreting  physician) on 05/20/2021 03:26 PM  ----------------------------------------------------------------   Findings  Left Ventricle Normal left ventricle structure and function. Left ventricular ejection fraction is visually estimated at 55%. Pseudonormal filling pattern noted. Right Ventricle Normal right ventricle structure and function. Normal right ventricle systolic pressure. Left Atrium Normal left atrium. Right Atrium Normal right atrium. Mitral Valve Mitral annular calcification is present. Tricuspid Valve Normal tricuspid valve structure and function. 1+ TR RVSP 48 mmHg Aortic Valve Aortic valve leaflets are mildly thickened. Pulmonic Valve The pulmonic valve was not well visualized . Pericardial Effusion small Pericardial Effusion Pleural Effusion No evidence of pleural effusion. Aorta \ Miscellaneous The aorta is within normal limits. M-Mode Measurements (cm)   LVIDd: 3.16 cm                         LVIDs: 1.93 cm  IVSd: 1.18 cm                          IVSs: 1.17 cm  LVPWd: 0.92 cm                         LVPWs: 1.06 cm  Rt. Vent.  Dimension: 1.64 cm           AO Root Dimension: 2.95 cm                                         ACS: 1.55 cm                                         LA: 2.47 cm                                         LVOT: 2.01 cm  Doppler Measurements:   AV Velocity:0.03 m/s                    MV Peak E-Wave: 1.12 m/s  AV Peak Gradient: 7.58 mmHg             MV Peak A-Wave: 0.83 m/s  AV Mean Gradient: 4.04 mmHg  AV Area (Continuity):2.92 cm^2  TR Velocity:3.06 m/s                    Estimated RAP:10 mmHg  TR Gradient:37.46 mmHg                  RVSP:47.46 mmHg  Valves  Mitral Valve   Peak E-Wave: 1.12 m/s                  Peak A-Wave: 0.83 m/s                                         E/A Ratio: 1.34                                         Peak Gradient: 4.98 mmHg                                         Deceleration Time: 187 msec   Tissue Doppler   E' Septal Velocity: 0.07 m/s  E' Lateral Velocity: 0.06 m/s   Aortic Valve   Peak Velocity: 1.38 m/s                Mean Velocity: 0.94 m/s  Peak Gradient: 7.58 mmHg               Mean Gradient: 4.04 mmHg  Area (continuity): 2.92 cm^2           Area (2D): 2.9 cm^2  AV VTI: 25.76 cm   Cusp Separation: 1.55 cm   Tricuspid Valve   Estimated RVSP: 47.46 mmHg              Estimated RAP: 10 mmHg  TR Velocity: 3.06 m/s                   TR Gradient: 37.46 mmHg   Pulmonic Valve   Peak Velocity: 1.16 m/s           Peak Gradient: 5.41 mmHg                                    Estimated PASP: 47.46 mmHg   LVOT   Peak Velocity: 1.31 m/s              Mean Velocity: 0.9 m/s  Peak Gradient: 6.82 mmHg             Mean Gradient: 3.71 mmHg  LVOT Diameter: 2.01 cm               LVOT VTI: 23.75 cm  Structures  Left Atrium   LA Dimension: 2.47 cm                        LA Area: 13.62 cm^2  LA/Aorta: 0.84  LA Volume/Index: 24.99 ml /15 m^2   Left Ventricle   Diastolic Dimension: 6.69 cm          Systolic Dimension: 1.38 cm  Septum Diastolic: 7.27 cm             Septum Systolic: 4.09 cm  PW Diastolic: 5.13 cm                 PW Systolic: 7.63 cm                                        FS: 38.9 %  LV EDV/LV EDV Index: 39.66 ml/24 m^2  LV ESV/LV ESV Index: 11.59 ml/7 m^2  EF Calculated: 70.8 %                 LV Length: 6.2 cm   LVOT Diameter: 2.01 cm   Right Atrium   RA Systolic Pressure: 10 mmHg   Right Ventricle   Diastolic Dimension: 0.17 cm                                    RV Systolic Pressure: 87.41 mmHg  Aorta/ Miscellaneous Aorta   Aortic Root: 2.95 cm  LVOT Diameter: 2.01 cm      XR RADIUS ULNA RIGHT (2 VIEWS)    Result Date: 5/19/2021  EXAMINATION: XR RADIUS ULNA RIGHT (2 VIEWS) CLINICAL HISTORY: Forearm pain COMPARISON: None available. TECHNIQUE: AP and lateral views of the radius and ulna. FINDINGS: Decreased bone mineralization. No acute fracture. Degenerative changes of the wrist. Soft tissues appear within normal limits. No acute osseous abnormality. CT Head WO Contrast    Result Date: 5/19/2021  EXAMINATION: CT of the brain without contrast HISTORY: Altered mental status COMPARISON: None available TECHNIQUE: Multiple contiguous axial images were obtained of the brain from the skull base through the vertex. Multiplanar reformats were obtained. FINDINGS: Prominence of the sulci and ventricles compatible with mild generalized parenchymal volume loss. Gray-white matter differentiation is preserved. Areas of bilateral supratentorial white matter hypoattenuation are nonspecific but most likely related to chronic small vessel ischemic changes in a patient of this age. No acute hemorrhage or abnormal extra-axial fluid collection. No mass effect or midline shift. The visualized paranasal sinuses and mastoid air cells are clear. Calvarium is intact. No acute intracranial process.  Generalized parenchymal volume loss and nonspecific white matter findings most compatible with chronic small vessel ischemic changes in a patient of this age. All CT scans at this facility use dose modulation, iterative reconstruction, and/or weight based dosing when appropriate to reduce radiation dose to as low as reasonably achievable. MRA head without contrast    Result Date: 5/19/2021  EXAM: MRI of the brain without contrast MRA of the brain without contrast History: Right-sided weakness Technique: Multiplanar multisequence MRI of the brain was performed without contrast. Axial 3-D time-of-flight images of the brain were obtained without contrast. 3-D volume rendered images were performed for evaluation of the cerebral vasculature. Comparison: CT brain May 19, 2021 Findings: MRI brain: Nonspecific white matter signal abnormality within the supratentorial white matter and kelsey is nonspecific but most compatible with chronic small vessel ischemic changes in a patient of this age. Prominence of the sulci and ventricles compatible with moderate generalized parenchymal volume loss. No edema, hemorrhage, mass, mass effect, midline shift, or abnormal extra-axial fluid collection. Midline structures are within normal limits. The posterior fossa is within normal limits. There is no diffusion restriction. No susceptibility artifact is identified on the gradient echo sequence. Cranial nerves 7/8 complexes appear grossly unremarkable. The visualized paranasal sinuses and bilateral mastoid air cells are clear. Degenerative changes of the cervical spine at C3-4 appears to result in in high-grade spinal canal stenosis but is incompletely evaluated on this examination. MRA brain: The bilateral distal cervical internal carotid arteries through the skull base are patent. The bilateral middle cerebral arteries through the trifurcation and opercular branches are patent.  The vertebrobasilar system including the superior cerebellar and posterior cerebral arteries are patent. Neither posterior communicating artery is identified. The bilateral anterior cerebral arteries and anterior communicating artery are patent. No high-grade stenosis of the visualized cerebral vasculature. 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 MRI brain:  No acute ischemia or acute intracranial process. Generalized parenchymal volume loss and nonspecific white matter findings most compatible with chronic small vessel ischemic changes in a patient of this age. Degenerative changes of the cervical spine at C3-4 appears to result in in high-grade spinal canal stenosis but is incompletely evaluated on this examination. This would be better evaluated with MRI of the cervical spine. MRA brain: 5 mm saccular aneurysm arises from the medial aspect of the proximal ophthalmic division of the left internal carotid artery. No high-grade stenosis of the visualized cerebral vasculature. MRI brain without contrast    Result Date: 5/19/2021  EXAM: MRI of the brain without contrast MRA of the brain without contrast History: Right-sided weakness Technique: Multiplanar multisequence MRI of the brain was performed without contrast. Axial 3-D time-of-flight images of the brain were obtained without contrast. 3-D volume rendered images were performed for evaluation of the cerebral vasculature. Comparison: CT brain May 19, 2021 Findings: MRI brain: Nonspecific white matter signal abnormality within the supratentorial white matter and kelsey is nonspecific but most compatible with chronic small vessel ischemic changes in a patient of this age. Prominence of the sulci and ventricles compatible with moderate generalized parenchymal volume loss. No edema, hemorrhage, mass, mass effect, midline shift, or abnormal extra-axial fluid collection. Midline structures are within normal limits.  The posterior fossa is within normal limits. There is no diffusion restriction. No susceptibility artifact is identified on the gradient echo sequence. Cranial nerves 7/8 complexes appear grossly unremarkable. The visualized paranasal sinuses and bilateral mastoid air cells are clear. Degenerative changes of the cervical spine at C3-4 appears to result in in high-grade spinal canal stenosis but is incompletely evaluated on this examination. MRA brain: The bilateral distal cervical internal carotid arteries through the skull base are patent. The bilateral middle cerebral arteries through the trifurcation and opercular branches are patent. The vertebrobasilar system including the superior cerebellar and posterior cerebral arteries are patent. Neither posterior communicating artery is identified. The bilateral anterior cerebral arteries and anterior communicating artery are patent. No high-grade stenosis of the visualized cerebral vasculature. 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 MRI brain:  No acute ischemia or acute intracranial process. Generalized parenchymal volume loss and nonspecific white matter findings most compatible with chronic small vessel ischemic changes in a patient of this age. Degenerative changes of the cervical spine at C3-4 appears to result in in high-grade spinal canal stenosis but is incompletely evaluated on this examination. This would be better evaluated with MRI of the cervical spine. MRA brain: 5 mm saccular aneurysm arises from the medial aspect of the proximal ophthalmic division of the left internal carotid artery. No high-grade stenosis of the visualized cerebral vasculature. US CAROTID ARTERY BILATERAL    Result Date: 5/19/2021  History:  right sided weakness Technique:  US CAROTID ARTERY BILATERAL Comparison: No prior Findings: No significant plaque is seen.  The common carotid arteries are tortuous as well as the internal carotid arteries. No increased velocity is seen. Normal antegrade flow is seen vertebral arteries. Doppler findings: ARTERIAL BLOOD FLOW VELOCITY RIGHT PS                                               Prox CCA 107cm/s             Mid CCA 110cm/s              Dist CCA 116cm/s              Prox %cm/s              Mid ICA 87cm/s              Dist ICA 19cm/s              Prox ECA 147cm/s              Dist VERT 75cm/s              Bulb ICA/CCA 1.0 LEFT PS Prox CCA 103cm/s              Mid CCA and ECAcm/s              Dist CCA 101cm/s              Prox ICA 78cm/s              Mid ICA 92cm/s              Dist ICA 118cm/s              Prox ECA 100cm/s              Prox VERT 47cm/s              Bulb ICA/CCA 1.2     Impression: 1. No hemodynamic significant stenosis on the right   2. No hemodynamic significant stenosis seen on the left Validated velocity measurements with angiographic measurements, velocity criteria are extrapolated from diameter data as defined by the Society of radiologist in ultrasound consensus conference radiology 2003; 229; 340-346. Discharge Medications:       Regine Donohue   [facility] Medication Instructions KUJ:[id number]    Printed on:05/26/21 1717   Medication Information                      folic acid (FOLVITE) 1 MG tablet  Take 1 tablet by mouth daily             levothyroxine (SYNTHROID) 88 MCG tablet  Take 88 mcg by mouth Daily                 Disposition:   Discharged to SNF. Any [facility] needs that were indicated and/or required as been addressed and set up by Social Work. Condition at discharge: Pt was medically stable at the time of discharge. Activity: activity as tolerated, fall precautions. Total time taken for discharging this patient: 40 minutes. Greater than 70% of time was spent focused exclusively on this patient. Time was taken to review chart, discuss plans with consultants, reconciling medications, discussing plan answering questions with patient. Signed:  Telma Bishop MD  5/26/2021, 6:21 PM  ----------------------------------------------------------------------------------------------------------------------    Ednaithe Necessary,     Please return to ER or call 911 if you develop any significant signs or symptoms. I may not have addressed all of your medical illnesses or the abnormal blood work or imaging therefore please ask your PCP No primary care provider on file. and other out patient specialists and providers  to obtain [facility] record entirely to follow up on all of the abnormal labs, imaging and findings that I have and have not addressed during your hospitalization. Discharging you from the hospital does not mean that your medical care ends here and now. You may still need additional work up, investigation, monitoring, and treatment to be handled from this point on by outside providers including your PCP, No primary care provider on file. , Specialists and other healthcare providers. Please review your list of discharge medications prior to resuming medications you might still have at home, as the medications you need to be taking, dosages or how often you must take them may have changed. For medication questions, contact your retail pharmacy and your PCP, No primary care provider on file. Jan Vázquez I STRONGLY RECOMMEND that you follow up with No primary care provider on file. within 3 to 5 days for a post hospitalization evaluation. This specific office visit is covered by your insurance, and is not the same as your annual doctor visit/ check up. This office visit is important, as it may prevent need for repeat and/or future hospitalizations. **    Your medical team at [facility] ([facility]) appreciates the opportunity to work with you to get well!     Sincerely,  Telma Bishop MD

## 2021-05-26 NOTE — PROGRESS NOTES
Nutrition Assessment     Type and Reason for Visit: RD Nutrition Re-Screen/LOS    Nutrition Recommendations/Plan:  Continue Current Diet    Nutrition Assessment:  Pt appears stable from a nutritional standpoint with noted good intake at meals/recieving modified consistancy diet, with no nutritional complaints. To continue to monitor. Malnutrition Assessment:  Malnutrition Status: Insufficient data    Estimated Daily Nutrient Needs:  Energy (kcal): 8305-1550 (kg x 25-27); Weight Used for Energy Requirements:  Current     Protein (g): 72-78 (kg x 1.2-1.3); Weight Used for Protein Requirements:  Current        Fluid (ml/day): ~1500; Weight Used for Fluid Requirements:  1 ml/kcal      Nutrition Related Findings: PMH- Alzheimer's, Parkinson's. 5/20 BSE-Dys 2 diet. Intake noted >75% usually at meals with assistance. No edema noted. Last BM noted 5/25. Current Nutrition Therapies:    DIET DYSPHAGIA MINCED AND MOIST; Anthropometric Measures:  · Height: 5' 3\" (160 cm)  · Current Body Wt: 132 lb (59.9 kg) (5/21; 142lb (5/22 ? ))   · BMI: 23.4    Nutrition Diagnosis:   · Swallowing difficulty related to cognitive or neurological impairment as evidenced by swallow study results    Nutrition Interventions:   Food and/or Nutrient Delivery:  Continue Current Diet  Nutrition Education/Counseling:  Education not indicated   Coordination of Nutrition Care:  Continue to monitor while inpatient    Goals:  Intake >75% of meals. stable weight.        Nutrition Monitoring and Evaluation:   Food/Nutrient Intake Outcomes:  Food and Nutrient Intake  Physical Signs/Symptoms Outcomes:  Weight, Biochemical Data, Meal Time Behavior, Chewing or Swallowing     Electronically signed by Virgilio Jordan RD, LD on 5/26/21 at 3:08 PM EDT

## 2021-05-26 NOTE — PLAN OF CARE
Nutrition Problem #1: Swallowing difficulty  Intervention: Food and/or Nutrient Delivery: Continue Current Diet  Nutritional Goals: Intake >75% of meals. stable weight.

## 2021-05-27 ENCOUNTER — OFFICE VISIT (OUTPATIENT)
Dept: GERIATRIC MEDICINE | Age: 86
End: 2021-05-27
Payer: MEDICARE

## 2021-05-27 DIAGNOSIS — R26.0 ATAXIC GAIT: ICD-10-CM

## 2021-05-27 DIAGNOSIS — R53.1 WEAKNESS: ICD-10-CM

## 2021-05-27 DIAGNOSIS — G45.9 TIA (TRANSIENT ISCHEMIC ATTACK): ICD-10-CM

## 2021-05-27 DIAGNOSIS — G31.83 LEWY BODY DEMENTIA WITHOUT BEHAVIORAL DISTURBANCE (HCC): Primary | ICD-10-CM

## 2021-05-27 DIAGNOSIS — F02.80 LEWY BODY DEMENTIA WITHOUT BEHAVIORAL DISTURBANCE (HCC): Primary | ICD-10-CM

## 2021-05-27 DIAGNOSIS — R47.1 DYSARTHRIA: ICD-10-CM

## 2021-05-27 PROCEDURE — 99305 1ST NF CARE MODERATE MDM 35: CPT | Performed by: INTERNAL MEDICINE

## 2021-05-28 NOTE — PROGRESS NOTES
Physical Therapy  Facility/Department: [facility] [id number]  Physical Therapy Discharge      NAME: Soni Warren    : 1935 (80 y.o.)  MRN: [id number]    Account: [de-identified]  Gender: female      Patient has been discharged from acute care hospital. DC patient from current PT program.      Electronically signed by Duke Foss PT on 21 at 8:56 AM EDT

## 2021-06-17 LAB
BILIRUBIN, URINE: NEGATIVE
BLOOD, URINE: NEGATIVE
CLARITY: ABNORMAL
COLOR: YELLOW
GLUCOSE URINE: NEGATIVE
KETONES, URINE: NEGATIVE
LEUKOCYTE ESTERASE, URINE: POSITIVE
NITRITE, URINE: ABNORMAL
PH UA: 6 (ref 4.5–8)
PROTEIN UA: ABNORMAL
SPECIFIC GRAVITY, URINE: 1.02
UROBILINOGEN, URINE: NORMAL

## 2021-06-22 ENCOUNTER — OFFICE VISIT (OUTPATIENT)
Dept: GERIATRIC MEDICINE | Age: 86
End: 2021-06-22
Payer: MEDICARE

## 2021-06-22 DIAGNOSIS — G30.1 LATE ONSET ALZHEIMER'S DISEASE WITHOUT BEHAVIORAL DISTURBANCE (HCC): ICD-10-CM

## 2021-06-22 DIAGNOSIS — F02.80 LATE ONSET ALZHEIMER'S DISEASE WITHOUT BEHAVIORAL DISTURBANCE (HCC): ICD-10-CM

## 2021-06-22 DIAGNOSIS — R26.81 GAIT INSTABILITY: ICD-10-CM

## 2021-06-22 DIAGNOSIS — R09.89 SUSPECTED CEREBROVASCULAR ACCIDENT (CVA): Primary | ICD-10-CM

## 2021-06-22 DIAGNOSIS — E03.9 HYPOTHYROIDISM, UNSPECIFIED TYPE: ICD-10-CM

## 2021-06-22 PROCEDURE — 99309 SBSQ NF CARE MODERATE MDM 30: CPT | Performed by: NURSE PRACTITIONER

## 2021-06-23 LAB
BASOPHILS ABSOLUTE: 0.05 /ΜL
BASOPHILS RELATIVE PERCENT: 0.8 %
BUN BLDV-MCNC: 14 MG/DL
CALCIUM SERPL-MCNC: 8.6 MG/DL
CHLORIDE BLD-SCNC: 103 MMOL/L
CO2: 26 MMOL/L
CREAT SERPL-MCNC: 0.63 MG/DL
EOSINOPHILS ABSOLUTE: 0.11 /ΜL
EOSINOPHILS RELATIVE PERCENT: 1.8 %
GFR CALCULATED: >60
GLUCOSE BLD-MCNC: 75 MG/DL
HCT VFR BLD CALC: 29.4 % (ref 36–46)
HEMOGLOBIN: 9.3 G/DL (ref 12–16)
LYMPHOCYTES ABSOLUTE: 1.1 /ΜL
LYMPHOCYTES RELATIVE PERCENT: 17.9 %
MCH RBC QN AUTO: 27.7 PG
MCHC RBC AUTO-ENTMCNC: 31.6 G/DL
MCV RBC AUTO: 87.5 FL
MONOCYTES ABSOLUTE: 0.53 /ΜL
MONOCYTES RELATIVE PERCENT: 8.6 %
NEUTROPHILS ABSOLUTE: 4.34 /ΜL
NEUTROPHILS RELATIVE PERCENT: 70.9 %
PLATELET # BLD: 318 K/ΜL
PMV BLD AUTO: 10.1 FL
POTASSIUM SERPL-SCNC: 4 MMOL/L
RBC # BLD: 3.36 10^6/ΜL
SODIUM BLD-SCNC: 137 MMOL/L
WBC # BLD: 6.13 10^3/ML

## 2021-06-24 LAB
FERRITIN: 498.6 NG/ML (ref 9–150)
IRON: 36
RETICULOCYTE ABSOLUTE COUNT: 0.05
RETICULOCYTE COUNT PCT: 1.6
TOTAL IRON BINDING CAPACITY: 178
VITAMIN B-12: 301

## 2021-06-26 NOTE — PROGRESS NOTES
Patient Name: Casey Handy  YOB: 1935  Medical Record Number: [id number]        History of Present Illness: This 80-year-old woman was admitted here after recent hospitalization. Patient has a known history of advancing dementia with Parkinson's type syndromes patient had been hospitalized change mental status. Patient had had prior partial colectomy has been hospitalized after ongoing decline confusion weakness patient's family had been that she had been declining unable to be maintained at home patient has been having ongoing cognitive decline there is concern the patient will be unable to maintain in the independent setting. Patient seen today pleasant but confused she is baseline cognitive. Unable to make her self well understood. Review of Systems   Unable to perform ROS: Dementia       Review of Systems: All 14 review of systems negative other than as stated above    Social History     Tobacco Use    Smoking status: Never Smoker   Substance Use Topics    Alcohol use: Not on file    Drug use: Not on file         Past Medical History:   Diagnosis Date    Alzheimer disease ([facility]     Thyroid disease            No past surgical history on file. Current Outpatient Medications on File Prior to Visit   Medication Sig Dispense Refill    folic acid (FOLVITE) 1 MG tablet Take 1 tablet by mouth daily 30 tablet 3    levothyroxine (SYNTHROID) 88 MCG tablet Take 88 mcg by mouth Daily       No current facility-administered medications on file prior to visit. No Known Allergies      No family history on file. Physical Exam:      Physical Exam  Vitals and nursing note reviewed. Constitutional:       Appearance: Normal appearance. HENT:      Head: Normocephalic and atraumatic. Nose: Nose normal.      Mouth/Throat:      Mouth: Mucous membranes are moist.   Eyes:      Extraocular Movements: Extraocular movements intact.    Cardiovascular:      Rate and Rhythm: Normal rate and regular rhythm. Pulses: Normal pulses. Heart sounds: No murmur heard. Pulmonary:      Effort: Pulmonary effort is normal.      Breath sounds: Normal breath sounds. Abdominal:      General: Abdomen is flat. There is no distension. Palpations: Abdomen is soft. There is no mass. Musculoskeletal:         General: No swelling. Normal range of motion. Cervical back: Neck supple. No tenderness. Skin:     General: Skin is warm and dry. Neurological:      Comments: Aphasia global weakness   Psychiatric:         Mood and Affect: Mood normal.         There were no vitals taken for this visit. .   Lab Results   Component Value Date    WBC 6.13 06/23/2021    HGB 9.3 (A) 06/23/2021    HCT 29.4 (A) 06/23/2021    MCV 87.5 06/23/2021     06/23/2021     Lab Results   Component Value Date     06/23/2021    K 4.0 06/23/2021     06/23/2021    CO2 26 06/23/2021    BUN 14 06/23/2021    CREATININE 0.63 06/23/2021    GLUCOSE 75 06/23/2021    CALCIUM 8.6 06/23/2021                ASSESSMENT:  Patient Active Problem List   Diagnosis    Right sided weakness    Change in mental status    Lewy body dementia without behavioral disturbance (HCC)    PD (Parkinson's disease) ([address]    Ataxic gait    Dysarthria    Myelopathy ([address]    Late onset Alzheimer's disease without behavioral disturbance ([address]         PLAN:   Diagnosis Orders   1. Lewy body dementia without behavioral disturbance ([address]     2. TIA (transient ischemic attack)     3. Dysarthria     4. Ataxic gait     5. Weakness         Course of physical therapy outpatient therapy for rehabilitation. Goal of maximizing functional status. Notes no acute psychosis or agitation. Goal maximize her functional status and possible long-term placement.

## 2021-07-02 ENCOUNTER — OFFICE VISIT (OUTPATIENT)
Dept: GERIATRIC MEDICINE | Age: 86
End: 2021-07-02
Payer: MEDICARE

## 2021-07-02 DIAGNOSIS — M15.9 OSTEOARTHRITIS OF MULTIPLE JOINTS, UNSPECIFIED OSTEOARTHRITIS TYPE: Primary | ICD-10-CM

## 2021-07-02 DIAGNOSIS — F03.90 DEMENTIA WITHOUT BEHAVIORAL DISTURBANCE, UNSPECIFIED DEMENTIA TYPE: ICD-10-CM

## 2021-07-02 DIAGNOSIS — E46 PROTEIN MALNUTRITION (HCC): ICD-10-CM

## 2021-07-02 PROCEDURE — 99309 SBSQ NF CARE MODERATE MDM 30: CPT | Performed by: INTERNAL MEDICINE

## 2021-07-17 PROBLEM — R09.89 SUSPECTED CEREBROVASCULAR ACCIDENT (CVA): Status: ACTIVE | Noted: 2021-07-17

## 2021-07-17 PROBLEM — E03.9 HYPOTHYROIDISM: Status: ACTIVE | Noted: 2021-07-17

## 2021-07-17 NOTE — PROGRESS NOTES
THE [facility]  Chief Complaint   Patient presents with    Follow-Up from Hospital       The patient is being seen today for followup after recent admission to this facility with primary diagnoses of suspected CVA, gait instability, Alzheimer's dementia without behaviors, and hypothyroidism. Nursing staff reports resident remains at her baseline and offer no concern. FAMILY AND SOCIAL HISTORY:  Reviewed on chart at nursing facility. Past Medical History:   Diagnosis Date    Alzheimer disease ([address]     Thyroid disease      MEDICATIONS & ALLERGIES: Reviewed on chart at nursing facility. Current Outpatient Medications on File Prior to Visit   Medication Sig Dispense Refill    folic acid (FOLVITE) 1 MG tablet Take 1 tablet by mouth daily 30 tablet 3    levothyroxine (SYNTHROID) 88 MCG tablet Take 88 mcg by mouth Daily       No current facility-administered medications on file prior to visit. REVIEW OF SYSTEMS:  Cannot be obtained due to resident's cognition level. PHYSICAL EXAMINATION:  Most recent vital signs:  /70, temp 98.1, heart rate 84, respirations 18, pulse oxing 98% on room air. Weight 134. Constitutional:  Resident is alert, elderly female, no apparent distress. Basically nonverbal.  Integument:  Pink, warm, dry. HEENT:  Normocephalic, atraumatic. Conjunctivae pink. Sclerae nonicteric. Mucous membranes pink, moist.  No oropharyngeal exudate. Neck:  Supple. No cervical or clavicular lymphadenopathy. Cardiovascular:  Heart rate regular rate and rhythm. No murmurs, gallops, rubs noted. Respiratory:  Lung sounds clear through all fields. Respirations even, nonlabored. Abdomen:  Flat, soft, nontender, nondistended, normoactive bowel sounds. PV:  Peripheral pulses present. No edema noted. ASSESSMENT AND PLAN:  1. Suspected CVA:  Resident has not had any extension of her symptoms. We will continue to monitor closely.     2.   Gait instability:  Resident will continue working with PT, OT, and [address] [place] [address]  3.   Alzheimer's dementia without behaviors:  Resident's mood is stable. She has not had any further decrease in her cognition or increase in her behaviors. 4.   Hypothyroidism: We will continue the resident's Synthroid as ordered. I have reviewed this resident's medication and treatment plan as well as most recent lab work. I will order BMP and CBC with diff. No further changes will be made at this time. Return in about 1 week (around 6/29/2021), or if symptoms worsen or fail to improve. Electronically Signed By: Liliana Marvin. Praveen Carvajal CNP on 07/17/2021 14:31:20  ______________________________  Liliana Marvin.  Praveen Carvajal CNP  /[id number]  D: 07/17/2021 11:46:57  T: 07/17/2021 12:41:53    cc: - The [facility] [address]

## 2021-07-22 VITALS — HEART RATE: 78 BPM | SYSTOLIC BLOOD PRESSURE: 97 MMHG | TEMPERATURE: 98.6 F | DIASTOLIC BLOOD PRESSURE: 61 MMHG

## 2021-07-22 NOTE — PROGRESS NOTES
THE [facility]    Seen for a routine visit for her dementia associated with falls, protein malnutrition. SUBJECTIVE:  This is an 79-year-old woman who is seen in her room. The patient is at her baseline, globally frail. Oral intake has been poor. Her weight remains stable, actually has had a slight improvement. The patient has not any new change in her bowel or bladder habits, is at her baseline _____     PHYSICAL EXAMINATION:  Her vital signs are stable. She is afebrile at 98.6, pulse was 78, blood pressure 97/61. She is frail in appearance. Pupils are small. They are reactive. Poor visual acuity. Poor dentition. Neck was supple. Chest showed diminished breath sounds. No crackles, no wheezing. Cardiovascular exam showed a regular rate. Abdomen:  Soft, nontender. Extremities showed + dorsal pedal pulse. Skin showed no evidence of rash. No axillary or inguinal lymphadenopathy. ASSESSMENT AND PLAN:  1. Degenerative joint disease. Continue with current anti-inflammatory agents. 2.   Dementia. The patient may benefit from Exelon patch. 3.   Protein malnutrition. The patient is clinically stable and at baseline weight. Monitoring closely. The patient may need an appetite stimulant. We will follow clinically.            ______________________________  Natalya Lobo M.D.  Carley.Svetlana  D: 07/02/2021 20:03:58  T: 07/02/2021 21:43:08    cc: - The [facility]

## 2023-02-20 NOTE — PLAN OF CARE
Patient called back, she states she wants to speak directly to Kenneth Schmitt at [phone], thank you   Problem: Pain:  Goal: Pain level will decrease  Description: Pain level will decrease  Outcome: Ongoing  Goal: Control of acute pain  Description: Control of acute pain  Outcome: Ongoing  Goal: Control of chronic pain  Description: Control of chronic pain  Outcome: Ongoing     Problem: IP BALANCE  Goal: LTG - Patient will maintain balance to allow for safe/functional mobility  Outcome: Ongoing     Problem: Falls - Risk of:  Goal: Will remain free from falls  Description: Will remain free from falls  Outcome: Ongoing  Goal: Absence of physical injury  Description: Absence of physical injury  Outcome: Ongoing     Problem: Skin Integrity:  Goal: Will show no infection signs and symptoms  Description: Will show no infection signs and symptoms  Outcome: Ongoing  Goal: Absence of new skin breakdown  Description: Absence of new skin breakdown  Outcome: Ongoing     Problem: IP MOBILITY  Goal: LTG - patient will demonstrate safe mobility requirements  Outcome: Ongoing     Problem: IP SWALLOWING  Goal: LTG - patient will tolerate the least restrictive diet consistency to allow for safe consumption of daily meals  Outcome: Ongoing     Problem: IP COMMUNICATION/DYSARTHRIA  Goal: LTG - patient will utilize compensatory intervention for intelligibility  Outcome: Ongoing